# Patient Record
Sex: FEMALE | Race: WHITE | NOT HISPANIC OR LATINO | Employment: OTHER | URBAN - METROPOLITAN AREA
[De-identification: names, ages, dates, MRNs, and addresses within clinical notes are randomized per-mention and may not be internally consistent; named-entity substitution may affect disease eponyms.]

---

## 2024-07-12 ENCOUNTER — APPOINTMENT (OUTPATIENT)
Dept: CT IMAGING | Facility: CLINIC | Age: 39
End: 2024-07-12
Attending: EMERGENCY MEDICINE
Payer: COMMERCIAL

## 2024-07-12 ENCOUNTER — HOSPITAL ENCOUNTER (EMERGENCY)
Facility: CLINIC | Age: 39
Discharge: ANOTHER HOSPITAL | End: 2024-07-12
Attending: EMERGENCY MEDICINE | Admitting: EMERGENCY MEDICINE
Payer: COMMERCIAL

## 2024-07-12 ENCOUNTER — ANESTHESIA EVENT (OUTPATIENT)
Dept: SURGERY | Facility: CLINIC | Age: 39
End: 2024-07-12
Payer: COMMERCIAL

## 2024-07-12 ENCOUNTER — ANESTHESIA (OUTPATIENT)
Dept: SURGERY | Facility: CLINIC | Age: 39
End: 2024-07-12
Payer: COMMERCIAL

## 2024-07-12 VITALS
HEART RATE: 118 BPM | OXYGEN SATURATION: 96 % | BODY MASS INDEX: 31.66 KG/M2 | HEIGHT: 66 IN | TEMPERATURE: 99.2 F | WEIGHT: 197 LBS | SYSTOLIC BLOOD PRESSURE: 142 MMHG | DIASTOLIC BLOOD PRESSURE: 93 MMHG | RESPIRATION RATE: 18 BRPM

## 2024-07-12 DIAGNOSIS — N23 RENAL COLIC ON LEFT SIDE: ICD-10-CM

## 2024-07-12 DIAGNOSIS — N39.0 COMPLICATED UTI (URINARY TRACT INFECTION): ICD-10-CM

## 2024-07-12 LAB
ALBUMIN UR-MCNC: 100 MG/DL
ANION GAP SERPL CALCULATED.3IONS-SCNC: 16 MMOL/L (ref 7–15)
APPEARANCE UR: ABNORMAL
BACTERIA #/AREA URNS HPF: ABNORMAL /HPF
BASOPHILS # BLD AUTO: 0.1 10E3/UL (ref 0–0.2)
BASOPHILS NFR BLD AUTO: 0 %
BILIRUB UR QL STRIP: NEGATIVE
BUN SERPL-MCNC: 12.1 MG/DL (ref 6–20)
CALCIUM SERPL-MCNC: 9 MG/DL (ref 8.6–10)
CHLORIDE SERPL-SCNC: 101 MMOL/L (ref 98–107)
COLOR UR AUTO: YELLOW
CREAT SERPL-MCNC: 0.96 MG/DL (ref 0.51–0.95)
DEPRECATED HCO3 PLAS-SCNC: 21 MMOL/L (ref 22–29)
EGFRCR SERPLBLD CKD-EPI 2021: 77 ML/MIN/1.73M2
EOSINOPHIL # BLD AUTO: 0 10E3/UL (ref 0–0.7)
EOSINOPHIL NFR BLD AUTO: 0 %
ERYTHROCYTE [DISTWIDTH] IN BLOOD BY AUTOMATED COUNT: 14.5 % (ref 10–15)
GLUCOSE SERPL-MCNC: 136 MG/DL (ref 70–99)
GLUCOSE UR STRIP-MCNC: NEGATIVE MG/DL
HCG SERPL QL: NEGATIVE
HCT VFR BLD AUTO: 38.7 % (ref 35–47)
HGB BLD-MCNC: 13.4 G/DL (ref 11.7–15.7)
HGB UR QL STRIP: ABNORMAL
HOLD SPECIMEN: NORMAL
HYALINE CASTS: 1 /LPF
IMM GRANULOCYTES # BLD: 0.1 10E3/UL
IMM GRANULOCYTES NFR BLD: 1 %
KETONES UR STRIP-MCNC: NEGATIVE MG/DL
LACTATE SERPL-SCNC: 1.6 MMOL/L (ref 0.7–2)
LEUKOCYTE ESTERASE UR QL STRIP: ABNORMAL
LYMPHOCYTES # BLD AUTO: 0.6 10E3/UL (ref 0.8–5.3)
LYMPHOCYTES NFR BLD AUTO: 3 %
MCH RBC QN AUTO: 27 PG (ref 26.5–33)
MCHC RBC AUTO-ENTMCNC: 34.6 G/DL (ref 31.5–36.5)
MCV RBC AUTO: 78 FL (ref 78–100)
MONOCYTES # BLD AUTO: 1.1 10E3/UL (ref 0–1.3)
MONOCYTES NFR BLD AUTO: 4 %
MUCOUS THREADS #/AREA URNS LPF: PRESENT /LPF
NEUTROPHILS # BLD AUTO: 23.9 10E3/UL (ref 1.6–8.3)
NEUTROPHILS NFR BLD AUTO: 93 %
NITRATE UR QL: NEGATIVE
NRBC # BLD AUTO: 0 10E3/UL
NRBC BLD AUTO-RTO: 0 /100
PH UR STRIP: 7 [PH] (ref 5–7)
PLATELET # BLD AUTO: 262 10E3/UL (ref 150–450)
POTASSIUM SERPL-SCNC: 3.4 MMOL/L (ref 3.4–5.3)
RBC # BLD AUTO: 4.97 10E6/UL (ref 3.8–5.2)
RBC URINE: 60 /HPF
SODIUM SERPL-SCNC: 138 MMOL/L (ref 135–145)
SP GR UR STRIP: 1.02 (ref 1–1.03)
SQUAMOUS EPITHELIAL: 4 /HPF
UROBILINOGEN UR STRIP-MCNC: NORMAL MG/DL
WBC # BLD AUTO: 25.8 10E3/UL (ref 4–11)
WBC URINE: 99 /HPF

## 2024-07-12 PROCEDURE — 36415 COLL VENOUS BLD VENIPUNCTURE: CPT | Performed by: EMERGENCY MEDICINE

## 2024-07-12 PROCEDURE — 250N000013 HC RX MED GY IP 250 OP 250 PS 637: Performed by: EMERGENCY MEDICINE

## 2024-07-12 PROCEDURE — 81001 URINALYSIS AUTO W/SCOPE: CPT | Performed by: EMERGENCY MEDICINE

## 2024-07-12 PROCEDURE — 258N000003 HC RX IP 258 OP 636: Performed by: EMERGENCY MEDICINE

## 2024-07-12 PROCEDURE — 96375 TX/PRO/DX INJ NEW DRUG ADDON: CPT | Performed by: EMERGENCY MEDICINE

## 2024-07-12 PROCEDURE — 96374 THER/PROPH/DIAG INJ IV PUSH: CPT | Performed by: EMERGENCY MEDICINE

## 2024-07-12 PROCEDURE — 87186 SC STD MICRODIL/AGAR DIL: CPT | Performed by: EMERGENCY MEDICINE

## 2024-07-12 PROCEDURE — 250N000011 HC RX IP 250 OP 636: Performed by: EMERGENCY MEDICINE

## 2024-07-12 PROCEDURE — 87086 URINE CULTURE/COLONY COUNT: CPT | Performed by: EMERGENCY MEDICINE

## 2024-07-12 PROCEDURE — 99285 EMERGENCY DEPT VISIT HI MDM: CPT | Mod: 25 | Performed by: EMERGENCY MEDICINE

## 2024-07-12 PROCEDURE — 99291 CRITICAL CARE FIRST HOUR: CPT | Performed by: EMERGENCY MEDICINE

## 2024-07-12 PROCEDURE — 85025 COMPLETE CBC W/AUTO DIFF WBC: CPT | Performed by: EMERGENCY MEDICINE

## 2024-07-12 PROCEDURE — 74176 CT ABD & PELVIS W/O CONTRAST: CPT

## 2024-07-12 PROCEDURE — 83605 ASSAY OF LACTIC ACID: CPT | Performed by: EMERGENCY MEDICINE

## 2024-07-12 PROCEDURE — 84703 CHORIONIC GONADOTROPIN ASSAY: CPT | Performed by: EMERGENCY MEDICINE

## 2024-07-12 PROCEDURE — 87149 DNA/RNA DIRECT PROBE: CPT | Mod: 59 | Performed by: EMERGENCY MEDICINE

## 2024-07-12 PROCEDURE — 99285 EMERGENCY DEPT VISIT HI MDM: CPT | Performed by: EMERGENCY MEDICINE

## 2024-07-12 PROCEDURE — 80048 BASIC METABOLIC PNL TOTAL CA: CPT | Performed by: EMERGENCY MEDICINE

## 2024-07-12 RX ORDER — SODIUM CHLORIDE 9 MG/ML
1000 INJECTION, SOLUTION INTRAVENOUS CONTINUOUS
Status: DISCONTINUED | OUTPATIENT
Start: 2024-07-12 | End: 2024-07-12 | Stop reason: HOSPADM

## 2024-07-12 RX ORDER — HYDROMORPHONE HYDROCHLORIDE 1 MG/ML
0.5 INJECTION, SOLUTION INTRAMUSCULAR; INTRAVENOUS; SUBCUTANEOUS ONCE
Status: COMPLETED | OUTPATIENT
Start: 2024-07-12 | End: 2024-07-12

## 2024-07-12 RX ORDER — ACETAMINOPHEN 500 MG
1000 TABLET ORAL ONCE
Status: COMPLETED | OUTPATIENT
Start: 2024-07-12 | End: 2024-07-12

## 2024-07-12 RX ORDER — CEFTRIAXONE 2 G/1
2 INJECTION, POWDER, FOR SOLUTION INTRAMUSCULAR; INTRAVENOUS ONCE
Status: COMPLETED | OUTPATIENT
Start: 2024-07-12 | End: 2024-07-12

## 2024-07-12 RX ADMIN — SODIUM CHLORIDE 500 ML: 9 INJECTION, SOLUTION INTRAVENOUS at 21:04

## 2024-07-12 RX ADMIN — ACETAMINOPHEN 1000 MG: 500 TABLET, FILM COATED ORAL at 19:02

## 2024-07-12 RX ADMIN — CEFTRIAXONE SODIUM 2 G: 2 INJECTION, POWDER, FOR SOLUTION INTRAMUSCULAR; INTRAVENOUS at 21:03

## 2024-07-12 RX ADMIN — HYDROMORPHONE HYDROCHLORIDE 0.5 MG: 1 INJECTION, SOLUTION INTRAMUSCULAR; INTRAVENOUS; SUBCUTANEOUS at 19:09

## 2024-07-12 RX ADMIN — SODIUM CHLORIDE 1000 ML: 9 INJECTION, SOLUTION INTRAVENOUS at 19:02

## 2024-07-12 ASSESSMENT — ACTIVITIES OF DAILY LIVING (ADL)
ADLS_ACUITY_SCORE: 35

## 2024-07-12 ASSESSMENT — COLUMBIA-SUICIDE SEVERITY RATING SCALE - C-SSRS
2. HAVE YOU ACTUALLY HAD ANY THOUGHTS OF KILLING YOURSELF IN THE PAST MONTH?: NO
6. HAVE YOU EVER DONE ANYTHING, STARTED TO DO ANYTHING, OR PREPARED TO DO ANYTHING TO END YOUR LIFE?: NO
1. IN THE PAST MONTH, HAVE YOU WISHED YOU WERE DEAD OR WISHED YOU COULD GO TO SLEEP AND NOT WAKE UP?: NO

## 2024-07-12 NOTE — ED PROVIDER NOTES
"BP (!) 175/117   Pulse (!) 142   Temp (!) 102.2  F (39  C) (Tympanic)   Resp 18   Ht 1.676 m (5' 6\")   Wt 89.4 kg (197 lb)   SpO2 97%   BMI 31.80 kg/m      Dejah Brown is a 39-year-old female presenting with complaints of left flank pain.  Patient has history of kidney stones.  Febrile to 102  F on arrival and tachycardic.  Given patient's history, I recommended she be evaluated in the emergency department.  We discussed that she will likely require further testing and/or treatment beyond the capabilities of the current urgent care setting including labs and potential imaging.  Patient expresses understanding of this and agreement with the plan.       Gena Castaneda PA-C  07/12/24 [id number]    "

## 2024-07-12 NOTE — ED PROVIDER NOTES
"  History     Chief Complaint   Patient presents with    Flank Pain    Fever     HPI  Dejah Brown is a 39 year old female past medical history significant for kidney stones who presents the emergency department complaining of left flank pain and fever.  Patient states symptoms began about 3:00 this afternoon sharp pain in her left flank.  She shortly afterwards developed a temperature up to 102.2 Fahrenheit on arrival.  She denies any recent illness denies headache or visual changes has not any neck pain denies any chest pain or shortness of breath.  She has not had any focal numbness weakness any extremity.  She denies any bowel or bladder dysfunction.  She denies any pain with urination and has not noticed any hematuria.  She has not any leg swelling calf pain.    Allergies:  No Known Allergies    Problem List:    There are no problems to display for this patient.       Past Medical History:    No past medical history on file.    Past Surgical History:    No past surgical history on file.    Family History:    No family history on file.    Social History:  Marital Status:   [2]        Medications:    No current outpatient medications on file.        Review of Systems  As per HPI.  Physical Exam   BP: (!) 175/117  Pulse: (!) 142  Temp: (!) 102.2  F (39  C)  Resp: 18  Height: 167.6 cm (5' 6\")  Weight: 89.4 kg (197 lb)  SpO2: 97 %      Physical Exam  Constitutional:       Appearance: Normal appearance. She is not toxic-appearing.      Comments: Mild generalized distress.   HENT:      Nose: Nose normal.      Mouth/Throat:      Mouth: Mucous membranes are moist.      Pharynx: Oropharynx is clear.   Eyes:      Conjunctiva/sclera: Conjunctivae normal.   Cardiovascular:      Rate and Rhythm: Normal rate and regular rhythm.      Pulses: Normal pulses.      Heart sounds: Normal heart sounds. No murmur heard.  Pulmonary:      Effort: Pulmonary effort is normal.      Breath sounds: Normal breath sounds. No stridor. No " wheezing or rhonchi.   Abdominal:      Comments: Soft, nondistended, tender to palpation of the left flank.  No anterior abdominal tenderness is noted no guarding or rebound is present.  There is no hernia or masses noted.  Bowel sounds are positive.   Musculoskeletal:         General: Normal range of motion.      Cervical back: Normal range of motion and neck supple.      Right lower leg: No edema.      Left lower leg: No edema.   Skin:     General: Skin is warm and dry.      Findings: No rash.   Neurological:      General: No focal deficit present.      Mental Status: She is alert and oriented to person, place, and time.      Sensory: No sensory deficit.      Motor: No weakness.      Coordination: Coordination normal.   Psychiatric:         Mood and Affect: Mood normal.     ED Course        Procedures              Critical Care time:  none               Results for orders placed or performed during the hospital encounter of 07/12/24 (from the past 24 hour(s))   CBC with platelets, differential    Narrative    The following orders were created for panel order CBC with platelets, differential.  Procedure                               Abnormality         Status                     ---------                               -----------         ------                     CBC with platelets and d...[[id number]]  Abnormal            Final result                 Please view results for these tests on the individual orders.   Basic metabolic panel   Result Value Ref Range    Sodium 138 135 - 145 mmol/L    Potassium 3.4 3.4 - 5.3 mmol/L    Chloride 101 98 - 107 mmol/L    Carbon Dioxide (CO2) 21 (L) 22 - 29 mmol/L    Anion Gap 16 (H) 7 - 15 mmol/L    Urea Nitrogen 12.1 6.0 - 20.0 mg/dL    Creatinine 0.96 (H) 0.51 - 0.95 mg/dL    GFR Estimate 77 >60 mL/min/1.73m2    Calcium 9.0 8.6 - 10.0 mg/dL    Glucose 136 (H) 70 - 99 mg/dL   Lactic acid whole blood   Result Value Ref Range    Lactic Acid 1.6 0.7 - 2.0 mmol/L   Blood Culture  Peripheral Blood    Specimen: Peripheral Blood   Result Value Ref Range    Culture Positive on the 1st day of incubation (A)     Culture Gram negative bacilli (AA)    HCG qualitative   Result Value Ref Range    hCG Serum Qualitative Negative Negative   [facility] Draw    Narrative    The following orders were created for panel order [facility].  Procedure                               Abnormality         Status                     ---------                               -----------         ------                     Extra Blue Top Tube[[id number]]                              Final result                 Please view results for these tests on the individual orders.   CBC with platelets and differential   Result Value Ref Range    WBC Count 25.8 (H) 4.0 - 11.0 10e3/uL    RBC Count 4.97 3.80 - 5.20 10e6/uL    Hemoglobin 13.4 11.7 - 15.7 g/dL    Hematocrit 38.7 35.0 - 47.0 %    MCV 78 78 - 100 fL    MCH 27.0 26.5 - 33.0 pg    MCHC 34.6 31.5 - 36.5 g/dL    RDW 14.5 10.0 - 15.0 %    Platelet Count 262 150 - 450 10e3/uL    % Neutrophils 93 %    % Lymphocytes 3 %    % Monocytes 4 %    % Eosinophils 0 %    % Basophils 0 %    % Immature Granulocytes 1 %    NRBCs per 100 WBC 0 <1 /100    Absolute Neutrophils 23.9 (H) 1.6 - 8.3 10e3/uL    Absolute Lymphocytes 0.6 (L) 0.8 - 5.3 10e3/uL    Absolute Monocytes 1.1 0.0 - 1.3 10e3/uL    Absolute Eosinophils 0.0 0.0 - 0.7 10e3/uL    Absolute Basophils 0.1 0.0 - 0.2 10e3/uL    Absolute Immature Granulocytes 0.1 <=0.4 10e3/uL    Absolute NRBCs 0.0 10e3/uL   Extra Blue Top Tube   Result Value Ref Range    Hold Specimen JIC    UA with Microscopic reflex to Culture    Specimen: Urine, Clean Catch   Result Value Ref Range    Color Urine Yellow Colorless, Straw, Light Yellow, Yellow    Appearance Urine Slightly Cloudy (A) Clear    Glucose Urine Negative Negative mg/dL    Bilirubin Urine Negative Negative    Ketones Urine Negative Negative mg/dL    Specific Gravity Urine 1.016 1.003 - 1.035     Blood Urine Moderate (A) Negative    pH Urine 7.0 5.0 - 7.0    Protein Albumin Urine 100 (A) Negative mg/dL    Urobilinogen Urine Normal Normal, 2.0 mg/dL    Nitrite Urine Negative Negative    Leukocyte Esterase Urine Large (A) Negative    Bacteria Urine Few (A) None Seen /HPF    Mucus Urine Present (A) None Seen /LPF    RBC Urine 60 (H) <=2 /HPF    WBC Urine 99 (H) <=5 /HPF    Squamous Epithelials Urine 4 (H) <=1 /HPF    Hyaline Casts Urine 1 <=2 /LPF    Narrative    Urine Culture ordered based on laboratory criteria   Blood Culture Peripheral Blood    Specimen: Peripheral Blood   Result Value Ref Range    Culture Positive on the 1st day of incubation (A)     Culture Gram negative bacilli (AA)    Verigene GN Panel    Specimen: Peripheral Blood   Result Value Ref Range    Acinetobacter species Not Detected Not Detected    Citrobacter species Not Detected Not Detected    Enterobacter species Not Detected Not Detected    Proteus species Not Detected Not Detected    Escherichia coli Detected (A) Not Detected    Klebsiella pneumoniae Not Detected Not Detected    Klebsiella oxytoca Not Detected Not Detected    Pseudomonas aeruginosa Not Detected Not Detected    CTX-M Not Detected Not Detected, NA    KPC Not Detected Not Detected, NA    NDM Not Detected Not Detected, NA    VIM Not Detected Not Detected, NA    IMP Not Detected Not Detected, NA    OXA Not Detected Not Detected, NA    Narrative    Specimen tested with ElectroJetigene multiplex, gram-negative blood culture nucleic acid test for the following targets: Acinetobacter species, Citrobacter species, Enterobacter species, Proteus species, Escherichia coli, Klebsiella pneumoniae, Klebsiella oxytoca, Pseudomonas aeruginosa, and the following resistance markers: CTX-M, KPC, NDM, VIM, IMP and OXA.   Abd/pelvis CT - no contrast - Stone Protocol    Addendum: 7/12/2024    CLERICAL ADDENDUM:    Typo in the body of the report. In the KIDNEYS AND BLADDER section, it should read:      Obstructing 4 x 5 mm proximal left ureteral stone, near the ureteropelvic junction.     There is an additional nonobstructing left renal lower pole 4 x 5 mm stone.     END ADDENDUM      Narrative    EXAM: CT ABDOMEN PELVIS W/O CONTRAST  LOCATION: [facility]  DATE: 7/12/2024    INDICATION: L flank pain ; fever , h o kidney stones  COMPARISON: None.  TECHNIQUE: CT scan of the abdomen and pelvis was performed without IV contrast. Multiplanar reformats were obtained. Dose reduction techniques were used.  CONTRAST: None.    FINDINGS:   LOWER CHEST: Minimal basilar atelectasis    HEPATOBILIARY: Cholecystectomy. Possible mild hepatic steatosis.    PANCREAS: Normal.    SPLEEN: Normal.    ADRENAL GLANDS: Normal.    KIDNEYS/BLADDER: Nonobstructing 4 x 5 mm proximal left ureteral stone, near the ureteropelvic junction. Nonobstructing left renal lower pole 4 x 5 mm stone. Mild to moderate left hydronephrosis and perinephric stranding.     BOWEL: Unremarkable bowel and appendix.    LYMPH NODES: No adenopathy.    VASCULATURE: Nonaneurysmal aorta.    PELVIC ORGANS: Normal.    MUSCULOSKELETAL: Normal.      Impression    IMPRESSION:     1.  Obstructing 4 x 5 mm stone near the left ureteropelvic junction with mild to moderate hydronephrosis.    2.  Additional nonobstructing 4 x 5 mm inferior left renal pelvis stone.    3.  No other stones.         Medications   acetaminophen (TYLENOL) tablet 1,000 mg (has no administration in time range)   sodium chloride 0.9% BOLUS 1,000 mL (has no administration in time range)       Assessments & Plan (with Medical Decision Making) records were reviewed including past medical history medications and allergies.  Patient has had no recent visits to her family doctor or urgent care or emergency medicine doctor.  Patient was given a liter of fluids and Dilaudid for pain.  Labs were obtained.  Patient was given Tylenol for fever.  Blood cultures were obtained.   Independently reviewed and interpreted lab studies.  His white count was 25.8 hemoglobin 13.4 platelet count 262 there was a left shift.  Lactic acid was 1.6.  Basic metabolic panel significant for carbon accent 21 anion gap 16 creatinine 0.96 with a glucose of 136.  Urinalysis with moderate blood large leukocyte Estrace few bacteria 60 RBCs 99 WBCs with 4 squamous cells.  Pregnancy test was negative.  CT renal stone protocol was ordered at this time.  I independently reviewed this and agree with radiologist findings of directing 4 x 5 mm stone near the left ureteropelvic junction with mild to moderate hydronephrosis and additional nonobstructing 4 x 5 mm inferior left renal pelvis stone is present.  Bowel and appendix are unremarkable.  Due to this finding I discussed the case with Dr. Conklin with [facility] urology department.  Due to the presentation labs and imaging study he recommended that the patient be transferred to the [facility] for stent placement.  Patient was covered with 2 g of ceftriaxone blood cultures had been drawn previously.  And  request to drive down to the [facility] on I discussed the risks and benefits of this and they are comfortable going down by car at this time.  Patient will initially go to the [facility] emergency department and I did discuss the case with Dr. Joe at the [facility] ED and he accepted the patient in transfer.     I have reviewed the nursing notes.    I have reviewed the findings, diagnosis, plan and need for follow up with the patient.             New Prescriptions    No medications on file       Final diagnoses:   Renal colic on left side   Complicated UTI (urinary tract infection)       7/12/2024   [facility] EMERGENCY DEPT       Isaac Craft MD  07/13/24 0943

## 2024-07-12 NOTE — ED TRIAGE NOTES
L flank pain with fever.  H/o infected kidney stone     Triage Assessment (Adult)       Row Name 07/12/24 8391          Triage Assessment    Airway WDL WDL        Cognitive/Neuro/Behavioral WDL    Cognitive/Neuro/Behavioral WDL WDL

## 2024-07-13 ENCOUNTER — HOSPITAL ENCOUNTER (INPATIENT)
Facility: CLINIC | Age: 39
LOS: 2 days | Discharge: HOME | End: 2024-07-15
Attending: EMERGENCY MEDICINE | Admitting: UROLOGY
Payer: COMMERCIAL

## 2024-07-13 ENCOUNTER — APPOINTMENT (OUTPATIENT)
Dept: GENERAL RADIOLOGY | Facility: CLINIC | Age: 39
End: 2024-07-13
Attending: UROLOGY
Payer: COMMERCIAL

## 2024-07-13 DIAGNOSIS — N12 PYELONEPHRITIS: ICD-10-CM

## 2024-07-13 DIAGNOSIS — N20.1 URETEROLITHIASIS: ICD-10-CM

## 2024-07-13 PROBLEM — N20.0 NEPHROLITHIASIS: Status: ACTIVE | Noted: 2024-07-13

## 2024-07-13 PROBLEM — N17.9 ACUTE KIDNEY FAILURE, UNSPECIFIED (H): Status: ACTIVE | Noted: 2024-07-13

## 2024-07-13 LAB
ABO/RH(D): NORMAL
ACINETOBACTER SPECIES: NOT DETECTED
ANION GAP SERPL CALCULATED.3IONS-SCNC: 13 MMOL/L (ref 7–15)
ANTIBODY SCREEN: NEGATIVE
APTT PPP: 24 SECONDS (ref 22–38)
BACTERIA UR CULT: NORMAL
BASE EXCESS BLDV CALC-SCNC: -7.3 MMOL/L (ref -3–3)
BUN SERPL-MCNC: 16.7 MG/DL (ref 6–20)
CALCIUM SERPL-MCNC: 7.7 MG/DL (ref 8.6–10)
CHLORIDE SERPL-SCNC: 107 MMOL/L (ref 98–107)
CITROBACTER SPECIES: NOT DETECTED
CREAT SERPL-MCNC: 1.5 MG/DL (ref 0.51–0.95)
CTX-M: NOT DETECTED
DEPRECATED HCO3 PLAS-SCNC: 18 MMOL/L (ref 22–29)
EGFRCR SERPLBLD CKD-EPI 2021: 45 ML/MIN/1.73M2
ENTEROBACTER SPECIES: NOT DETECTED
ERYTHROCYTE [DISTWIDTH] IN BLOOD BY AUTOMATED COUNT: 15.3 % (ref 10–15)
ESCHERICHIA COLI: DETECTED
GLUCOSE SERPL-MCNC: 109 MG/DL (ref 70–99)
HCO3 BLDV-SCNC: 17 MMOL/L (ref 21–28)
HCT VFR BLD AUTO: 33.2 % (ref 35–47)
HGB BLD-MCNC: 10.8 G/DL (ref 11.7–15.7)
HOLD SPECIMEN: NORMAL
IMP: NOT DETECTED
INR PPP: 1.09 (ref 0.85–1.15)
KLEBSIELLA OXYTOCA: NOT DETECTED
KLEBSIELLA PNEUMONIAE: NOT DETECTED
KPC: NOT DETECTED
LACTATE SERPL-SCNC: 4.6 MMOL/L (ref 0.7–2)
LACTATE SERPL-SCNC: 7.2 MMOL/L (ref 0.7–2)
MCH RBC QN AUTO: 26.9 PG (ref 26.5–33)
MCHC RBC AUTO-ENTMCNC: 32.5 G/DL (ref 31.5–36.5)
MCV RBC AUTO: 83 FL (ref 78–100)
NDM: NOT DETECTED
O2/TOTAL GAS SETTING VFR VENT: 21 %
OXA (DETECTED/NOT DETECTED): NOT DETECTED
OXYHGB MFR BLDV: 46 % (ref 70–75)
PCO2 BLDV: 33 MM HG (ref 40–50)
PH BLDV: 7.34 [PH] (ref 7.32–7.43)
PLATELET # BLD AUTO: 154 10E3/UL (ref 150–450)
PO2 BLDV: 26 MM HG (ref 25–47)
POTASSIUM SERPL-SCNC: 2.8 MMOL/L (ref 3.4–5.3)
POTASSIUM SERPL-SCNC: 4.5 MMOL/L (ref 3.4–5.3)
PROTEUS SPECIES: NOT DETECTED
PSEUDOMONAS AERUGINOSA: NOT DETECTED
RBC # BLD AUTO: 4.01 10E6/UL (ref 3.8–5.2)
SAO2 % BLDV: 47.2 % (ref 70–75)
SODIUM SERPL-SCNC: 138 MMOL/L (ref 135–145)
SPECIMEN EXPIRATION DATE: NORMAL
VIM: NOT DETECTED
WBC # BLD AUTO: 21 10E3/UL (ref 4–11)

## 2024-07-13 PROCEDURE — 83605 ASSAY OF LACTIC ACID: CPT | Performed by: EMERGENCY MEDICINE

## 2024-07-13 PROCEDURE — C1769 GUIDE WIRE: HCPCS | Performed by: UROLOGY

## 2024-07-13 PROCEDURE — 370N000017 HC ANESTHESIA TECHNICAL FEE, PER MIN: Performed by: UROLOGY

## 2024-07-13 PROCEDURE — 250N000009 HC RX 250: Performed by: NURSE ANESTHETIST, CERTIFIED REGISTERED

## 2024-07-13 PROCEDURE — 36415 COLL VENOUS BLD VENIPUNCTURE: CPT | Performed by: UROLOGY

## 2024-07-13 PROCEDURE — 82805 BLOOD GASES W/O2 SATURATION: CPT | Performed by: EMERGENCY MEDICINE

## 2024-07-13 PROCEDURE — 85730 THROMBOPLASTIN TIME PARTIAL: CPT | Performed by: EMERGENCY MEDICINE

## 2024-07-13 PROCEDURE — 87186 SC STD MICRODIL/AGAR DIL: CPT | Performed by: UROLOGY

## 2024-07-13 PROCEDURE — 36415 COLL VENOUS BLD VENIPUNCTURE: CPT | Performed by: EMERGENCY MEDICINE

## 2024-07-13 PROCEDURE — 250N000013 HC RX MED GY IP 250 OP 250 PS 637: Performed by: STUDENT IN AN ORGANIZED HEALTH CARE EDUCATION/TRAINING PROGRAM

## 2024-07-13 PROCEDURE — 258N000003 HC RX IP 258 OP 636: Performed by: STUDENT IN AN ORGANIZED HEALTH CARE EDUCATION/TRAINING PROGRAM

## 2024-07-13 PROCEDURE — 0T778DZ DILATION OF LEFT URETER WITH INTRALUMINAL DEVICE, VIA NATURAL OR ARTIFICIAL OPENING ENDOSCOPIC: ICD-10-PCS | Performed by: UROLOGY

## 2024-07-13 PROCEDURE — 250N000011 HC RX IP 250 OP 636: Performed by: EMERGENCY MEDICINE

## 2024-07-13 PROCEDURE — 999N000141 HC STATISTIC PRE-PROCEDURE NURSING ASSESSMENT: Performed by: UROLOGY

## 2024-07-13 PROCEDURE — 86900 BLOOD TYPING SEROLOGIC ABO: CPT | Performed by: EMERGENCY MEDICINE

## 2024-07-13 PROCEDURE — 99223 1ST HOSP IP/OBS HIGH 75: CPT | Mod: 25 | Performed by: UROLOGY

## 2024-07-13 PROCEDURE — C2617 STENT, NON-COR, TEM W/O DEL: HCPCS | Performed by: UROLOGY

## 2024-07-13 PROCEDURE — 710N000010 HC RECOVERY PHASE 1, LEVEL 2, PER MIN: Performed by: UROLOGY

## 2024-07-13 PROCEDURE — 250N000011 HC RX IP 250 OP 636: Performed by: UROLOGY

## 2024-07-13 PROCEDURE — 52332 CYSTOSCOPY AND TREATMENT: CPT | Performed by: NURSE ANESTHETIST, CERTIFIED REGISTERED

## 2024-07-13 PROCEDURE — 250N000011 HC RX IP 250 OP 636: Performed by: NURSE ANESTHETIST, CERTIFIED REGISTERED

## 2024-07-13 PROCEDURE — 250N000025 HC SEVOFLURANE, PER MIN: Performed by: UROLOGY

## 2024-07-13 PROCEDURE — 99140 ANES COMP EMERGENCY COND: CPT | Performed by: STUDENT IN AN ORGANIZED HEALTH CARE EDUCATION/TRAINING PROGRAM

## 2024-07-13 PROCEDURE — 250N000013 HC RX MED GY IP 250 OP 250 PS 637: Performed by: EMERGENCY MEDICINE

## 2024-07-13 PROCEDURE — 85610 PROTHROMBIN TIME: CPT | Performed by: EMERGENCY MEDICINE

## 2024-07-13 PROCEDURE — 255N000002 HC RX 255 OP 636: Performed by: UROLOGY

## 2024-07-13 PROCEDURE — 84132 ASSAY OF SERUM POTASSIUM: CPT | Performed by: UROLOGY

## 2024-07-13 PROCEDURE — 85027 COMPLETE CBC AUTOMATED: CPT | Performed by: STUDENT IN AN ORGANIZED HEALTH CARE EDUCATION/TRAINING PROGRAM

## 2024-07-13 PROCEDURE — 52332 CYSTOSCOPY AND TREATMENT: CPT | Mod: LT | Performed by: UROLOGY

## 2024-07-13 PROCEDURE — 272N000001 HC OR GENERAL SUPPLY STERILE: Performed by: UROLOGY

## 2024-07-13 PROCEDURE — 87086 URINE CULTURE/COLONY COUNT: CPT | Performed by: UROLOGY

## 2024-07-13 PROCEDURE — 999N000180 XR SURGERY CARM FLUORO LESS THAN 5 MIN: Mod: TC

## 2024-07-13 PROCEDURE — 360N000082 HC SURGERY LEVEL 2 W/ FLUORO, PER MIN: Performed by: UROLOGY

## 2024-07-13 PROCEDURE — 80048 BASIC METABOLIC PNL TOTAL CA: CPT | Performed by: STUDENT IN AN ORGANIZED HEALTH CARE EDUCATION/TRAINING PROGRAM

## 2024-07-13 PROCEDURE — 52332 CYSTOSCOPY AND TREATMENT: CPT | Performed by: STUDENT IN AN ORGANIZED HEALTH CARE EDUCATION/TRAINING PROGRAM

## 2024-07-13 PROCEDURE — 250N000011 HC RX IP 250 OP 636: Performed by: STUDENT IN AN ORGANIZED HEALTH CARE EDUCATION/TRAINING PROGRAM

## 2024-07-13 PROCEDURE — 258N000003 HC RX IP 258 OP 636: Performed by: EMERGENCY MEDICINE

## 2024-07-13 PROCEDURE — 36415 COLL VENOUS BLD VENIPUNCTURE: CPT | Performed by: STUDENT IN AN ORGANIZED HEALTH CARE EDUCATION/TRAINING PROGRAM

## 2024-07-13 PROCEDURE — 99140 ANES COMP EMERGENCY COND: CPT | Performed by: NURSE ANESTHETIST, CERTIFIED REGISTERED

## 2024-07-13 PROCEDURE — 120N000002 HC R&B MED SURG/OB UMMC

## 2024-07-13 DEVICE — STENT URETERAL PERCUFLEX PLUS 7FRX28CM M0061752740: Type: IMPLANTABLE DEVICE | Site: URETER | Status: FUNCTIONAL

## 2024-07-13 RX ORDER — LABETALOL HYDROCHLORIDE 5 MG/ML
10 INJECTION, SOLUTION INTRAVENOUS
Status: DISCONTINUED | OUTPATIENT
Start: 2024-07-13 | End: 2024-07-13 | Stop reason: HOSPADM

## 2024-07-13 RX ORDER — HYDROMORPHONE HYDROCHLORIDE 1 MG/ML
0.2 INJECTION, SOLUTION INTRAMUSCULAR; INTRAVENOUS; SUBCUTANEOUS EVERY 5 MIN PRN
Status: DISCONTINUED | OUTPATIENT
Start: 2024-07-13 | End: 2024-07-13 | Stop reason: HOSPADM

## 2024-07-13 RX ORDER — ACETAMINOPHEN 325 MG/1
975 TABLET ORAL
Status: DISCONTINUED | OUTPATIENT
Start: 2024-07-13 | End: 2024-07-13 | Stop reason: HOSPADM

## 2024-07-13 RX ORDER — IOPAMIDOL 510 MG/ML
INJECTION, SOLUTION INTRAVASCULAR PRN
Status: DISCONTINUED | OUTPATIENT
Start: 2024-07-13 | End: 2024-07-13 | Stop reason: HOSPADM

## 2024-07-13 RX ORDER — AMOXICILLIN 250 MG
1 CAPSULE ORAL 2 TIMES DAILY
Status: DISCONTINUED | OUTPATIENT
Start: 2024-07-13 | End: 2024-07-15 | Stop reason: HOSPADM

## 2024-07-13 RX ORDER — METOPROLOL SUCCINATE 25 MG/1
25 TABLET, EXTENDED RELEASE ORAL DAILY
COMMUNITY

## 2024-07-13 RX ORDER — ACETAMINOPHEN 500 MG
1000 TABLET ORAL ONCE
Status: COMPLETED | OUTPATIENT
Start: 2024-07-13 | End: 2024-07-13

## 2024-07-13 RX ORDER — HYDROMORPHONE HCL IN WATER/PF 6 MG/30 ML
0.4 PATIENT CONTROLLED ANALGESIA SYRINGE INTRAVENOUS
Status: DISCONTINUED | OUTPATIENT
Start: 2024-07-13 | End: 2024-07-15 | Stop reason: HOSPADM

## 2024-07-13 RX ORDER — HYDROMORPHONE HYDROCHLORIDE 1 MG/ML
0.4 INJECTION, SOLUTION INTRAMUSCULAR; INTRAVENOUS; SUBCUTANEOUS EVERY 5 MIN PRN
Status: DISCONTINUED | OUTPATIENT
Start: 2024-07-13 | End: 2024-07-13 | Stop reason: HOSPADM

## 2024-07-13 RX ORDER — SODIUM CHLORIDE, SODIUM LACTATE, POTASSIUM CHLORIDE, CALCIUM CHLORIDE 600; 310; 30; 20 MG/100ML; MG/100ML; MG/100ML; MG/100ML
INJECTION, SOLUTION INTRAVENOUS CONTINUOUS
Status: DISCONTINUED | OUTPATIENT
Start: 2024-07-13 | End: 2024-07-13 | Stop reason: HOSPADM

## 2024-07-13 RX ORDER — KETOROLAC TROMETHAMINE 15 MG/ML
15 INJECTION, SOLUTION INTRAMUSCULAR; INTRAVENOUS ONCE
Status: COMPLETED | OUTPATIENT
Start: 2024-07-13 | End: 2024-07-13

## 2024-07-13 RX ORDER — ONDANSETRON 4 MG/1
4 TABLET, ORALLY DISINTEGRATING ORAL EVERY 30 MIN PRN
Status: DISCONTINUED | OUTPATIENT
Start: 2024-07-13 | End: 2024-07-13 | Stop reason: HOSPADM

## 2024-07-13 RX ORDER — FENTANYL CITRATE 50 UG/ML
50 INJECTION, SOLUTION INTRAMUSCULAR; INTRAVENOUS EVERY 5 MIN PRN
Status: DISCONTINUED | OUTPATIENT
Start: 2024-07-13 | End: 2024-07-13 | Stop reason: HOSPADM

## 2024-07-13 RX ORDER — ONDANSETRON 2 MG/ML
8 INJECTION INTRAMUSCULAR; INTRAVENOUS ONCE
Status: COMPLETED | OUTPATIENT
Start: 2024-07-13 | End: 2024-07-13

## 2024-07-13 RX ORDER — PROCHLORPERAZINE 25 MG
25 SUPPOSITORY, RECTAL RECTAL EVERY 12 HOURS PRN
Status: DISCONTINUED | OUTPATIENT
Start: 2024-07-13 | End: 2024-07-15 | Stop reason: HOSPADM

## 2024-07-13 RX ORDER — NALOXONE HYDROCHLORIDE 0.4 MG/ML
0.2 INJECTION, SOLUTION INTRAMUSCULAR; INTRAVENOUS; SUBCUTANEOUS
Status: DISCONTINUED | OUTPATIENT
Start: 2024-07-13 | End: 2024-07-15 | Stop reason: HOSPADM

## 2024-07-13 RX ORDER — OXYCODONE HYDROCHLORIDE 5 MG/1
5 TABLET ORAL EVERY 4 HOURS PRN
Status: DISCONTINUED | OUTPATIENT
Start: 2024-07-13 | End: 2024-07-15 | Stop reason: HOSPADM

## 2024-07-13 RX ORDER — CEFTRIAXONE 1 G/1
1 INJECTION, POWDER, FOR SOLUTION INTRAMUSCULAR; INTRAVENOUS EVERY 24 HOURS
Status: DISCONTINUED | OUTPATIENT
Start: 2024-07-13 | End: 2024-07-14

## 2024-07-13 RX ORDER — SODIUM CHLORIDE 9 MG/ML
INJECTION, SOLUTION INTRAVENOUS CONTINUOUS
Status: DISCONTINUED | OUTPATIENT
Start: 2024-07-13 | End: 2024-07-13

## 2024-07-13 RX ORDER — ONDANSETRON 4 MG/1
4 TABLET, ORALLY DISINTEGRATING ORAL EVERY 6 HOURS PRN
Status: DISCONTINUED | OUTPATIENT
Start: 2024-07-13 | End: 2024-07-15 | Stop reason: HOSPADM

## 2024-07-13 RX ORDER — AMOXICILLIN 250 MG
1 CAPSULE ORAL 2 TIMES DAILY PRN
Status: DISCONTINUED | OUTPATIENT
Start: 2024-07-13 | End: 2024-07-15 | Stop reason: HOSPADM

## 2024-07-13 RX ORDER — ACETAMINOPHEN 650 MG/1
650 SUPPOSITORY RECTAL EVERY 4 HOURS PRN
Status: DISCONTINUED | OUTPATIENT
Start: 2024-07-13 | End: 2024-07-15 | Stop reason: HOSPADM

## 2024-07-13 RX ORDER — NALOXONE HYDROCHLORIDE 0.4 MG/ML
0.4 INJECTION, SOLUTION INTRAMUSCULAR; INTRAVENOUS; SUBCUTANEOUS
Status: DISCONTINUED | OUTPATIENT
Start: 2024-07-13 | End: 2024-07-15 | Stop reason: HOSPADM

## 2024-07-13 RX ORDER — DEXAMETHASONE SODIUM PHOSPHATE 4 MG/ML
4 INJECTION, SOLUTION INTRA-ARTICULAR; INTRALESIONAL; INTRAMUSCULAR; INTRAVENOUS; SOFT TISSUE
Status: DISCONTINUED | OUTPATIENT
Start: 2024-07-13 | End: 2024-07-13 | Stop reason: HOSPADM

## 2024-07-13 RX ORDER — LIDOCAINE 40 MG/G
CREAM TOPICAL
Status: DISCONTINUED | OUTPATIENT
Start: 2024-07-13 | End: 2024-07-15 | Stop reason: HOSPADM

## 2024-07-13 RX ORDER — AMOXICILLIN 250 MG
2 CAPSULE ORAL 2 TIMES DAILY
Status: DISCONTINUED | OUTPATIENT
Start: 2024-07-13 | End: 2024-07-15 | Stop reason: HOSPADM

## 2024-07-13 RX ORDER — LIDOCAINE HYDROCHLORIDE 20 MG/ML
INJECTION, SOLUTION INFILTRATION; PERINEURAL PRN
Status: DISCONTINUED | OUTPATIENT
Start: 2024-07-13 | End: 2024-07-13

## 2024-07-13 RX ORDER — ONDANSETRON 2 MG/ML
4 INJECTION INTRAMUSCULAR; INTRAVENOUS EVERY 6 HOURS PRN
Status: DISCONTINUED | OUTPATIENT
Start: 2024-07-13 | End: 2024-07-15 | Stop reason: HOSPADM

## 2024-07-13 RX ORDER — SODIUM CHLORIDE 9 MG/ML
INJECTION, SOLUTION INTRAVENOUS CONTINUOUS
Status: DISCONTINUED | OUTPATIENT
Start: 2024-07-13 | End: 2024-07-15 | Stop reason: HOSPADM

## 2024-07-13 RX ORDER — ACETAMINOPHEN 325 MG/1
650 TABLET ORAL EVERY 4 HOURS PRN
Status: DISCONTINUED | OUTPATIENT
Start: 2024-07-13 | End: 2024-07-15 | Stop reason: HOSPADM

## 2024-07-13 RX ORDER — CALCIUM CARBONATE 500 MG/1
1000 TABLET, CHEWABLE ORAL 4 TIMES DAILY PRN
Status: DISCONTINUED | OUTPATIENT
Start: 2024-07-13 | End: 2024-07-15 | Stop reason: HOSPADM

## 2024-07-13 RX ORDER — SIMETHICONE 80 MG
80 TABLET,CHEWABLE ORAL EVERY 6 HOURS PRN
Status: DISCONTINUED | OUTPATIENT
Start: 2024-07-13 | End: 2024-07-15 | Stop reason: HOSPADM

## 2024-07-13 RX ORDER — ONDANSETRON 2 MG/ML
4 INJECTION INTRAMUSCULAR; INTRAVENOUS EVERY 30 MIN PRN
Status: DISCONTINUED | OUTPATIENT
Start: 2024-07-13 | End: 2024-07-13 | Stop reason: HOSPADM

## 2024-07-13 RX ORDER — SODIUM CHLORIDE, SODIUM LACTATE, POTASSIUM CHLORIDE, CALCIUM CHLORIDE 600; 310; 30; 20 MG/100ML; MG/100ML; MG/100ML; MG/100ML
INJECTION, SOLUTION INTRAVENOUS CONTINUOUS
Status: DISCONTINUED | OUTPATIENT
Start: 2024-07-13 | End: 2024-07-13

## 2024-07-13 RX ORDER — POTASSIUM CHLORIDE 7.45 MG/ML
10 INJECTION INTRAVENOUS
Status: COMPLETED | OUTPATIENT
Start: 2024-07-13 | End: 2024-07-13

## 2024-07-13 RX ORDER — FENTANYL CITRATE 50 UG/ML
25 INJECTION, SOLUTION INTRAMUSCULAR; INTRAVENOUS EVERY 5 MIN PRN
Status: DISCONTINUED | OUTPATIENT
Start: 2024-07-13 | End: 2024-07-13 | Stop reason: HOSPADM

## 2024-07-13 RX ORDER — AMOXICILLIN 250 MG
2 CAPSULE ORAL 2 TIMES DAILY PRN
Status: DISCONTINUED | OUTPATIENT
Start: 2024-07-13 | End: 2024-07-15 | Stop reason: HOSPADM

## 2024-07-13 RX ORDER — PROPOFOL 10 MG/ML
INJECTION, EMULSION INTRAVENOUS PRN
Status: DISCONTINUED | OUTPATIENT
Start: 2024-07-13 | End: 2024-07-13

## 2024-07-13 RX ORDER — DEXAMETHASONE SODIUM PHOSPHATE 4 MG/ML
INJECTION, SOLUTION INTRA-ARTICULAR; INTRALESIONAL; INTRAMUSCULAR; INTRAVENOUS; SOFT TISSUE PRN
Status: DISCONTINUED | OUTPATIENT
Start: 2024-07-13 | End: 2024-07-13

## 2024-07-13 RX ORDER — HYDROMORPHONE HYDROCHLORIDE 1 MG/ML
0.5 INJECTION, SOLUTION INTRAMUSCULAR; INTRAVENOUS; SUBCUTANEOUS
Status: DISCONTINUED | OUTPATIENT
Start: 2024-07-13 | End: 2024-07-15 | Stop reason: HOSPADM

## 2024-07-13 RX ORDER — AMLODIPINE BESYLATE 10 MG/1
10 TABLET ORAL DAILY
COMMUNITY

## 2024-07-13 RX ORDER — PROCHLORPERAZINE MALEATE 5 MG
10 TABLET ORAL EVERY 6 HOURS PRN
Status: DISCONTINUED | OUTPATIENT
Start: 2024-07-13 | End: 2024-07-15 | Stop reason: HOSPADM

## 2024-07-13 RX ORDER — FENTANYL CITRATE 50 UG/ML
INJECTION, SOLUTION INTRAMUSCULAR; INTRAVENOUS PRN
Status: DISCONTINUED | OUTPATIENT
Start: 2024-07-13 | End: 2024-07-13

## 2024-07-13 RX ORDER — HYDROMORPHONE HCL IN WATER/PF 6 MG/30 ML
0.2 PATIENT CONTROLLED ANALGESIA SYRINGE INTRAVENOUS
Status: DISCONTINUED | OUTPATIENT
Start: 2024-07-13 | End: 2024-07-15 | Stop reason: HOSPADM

## 2024-07-13 RX ORDER — FERROUS SULFATE 325(65) MG
325 TABLET ORAL
COMMUNITY

## 2024-07-13 RX ORDER — NALOXONE HYDROCHLORIDE 0.4 MG/ML
0.1 INJECTION, SOLUTION INTRAMUSCULAR; INTRAVENOUS; SUBCUTANEOUS
Status: DISCONTINUED | OUTPATIENT
Start: 2024-07-13 | End: 2024-07-13 | Stop reason: HOSPADM

## 2024-07-13 RX ORDER — TAMSULOSIN HYDROCHLORIDE 0.4 MG/1
0.4 CAPSULE ORAL DAILY
Status: DISCONTINUED | OUTPATIENT
Start: 2024-07-13 | End: 2024-07-15 | Stop reason: HOSPADM

## 2024-07-13 RX ORDER — OXYBUTYNIN CHLORIDE 5 MG/1
5 TABLET ORAL 3 TIMES DAILY PRN
Status: DISCONTINUED | OUTPATIENT
Start: 2024-07-13 | End: 2024-07-15 | Stop reason: HOSPADM

## 2024-07-13 RX ADMIN — LIDOCAINE HYDROCHLORIDE 80 MG: 20 INJECTION, SOLUTION INFILTRATION; PERINEURAL at 02:58

## 2024-07-13 RX ADMIN — ACETAMINOPHEN 650 MG: 325 TABLET, FILM COATED ORAL at 20:24

## 2024-07-13 RX ADMIN — POTASSIUM CHLORIDE 10 MEQ: 7.46 INJECTION, SOLUTION INTRAVENOUS at 10:40

## 2024-07-13 RX ADMIN — POTASSIUM CHLORIDE 10 MEQ: 7.46 INJECTION, SOLUTION INTRAVENOUS at 09:27

## 2024-07-13 RX ADMIN — DEXAMETHASONE SODIUM PHOSPHATE 4 MG: 4 INJECTION, SOLUTION INTRA-ARTICULAR; INTRALESIONAL; INTRAMUSCULAR; INTRAVENOUS; SOFT TISSUE at 03:15

## 2024-07-13 RX ADMIN — SUCCINYLCHOLINE CHLORIDE 100 MG: 20 INJECTION, SOLUTION INTRAMUSCULAR; INTRAVENOUS; PARENTERAL at 03:00

## 2024-07-13 RX ADMIN — SODIUM CHLORIDE 1000 ML: 9 INJECTION, SOLUTION INTRAVENOUS at 00:48

## 2024-07-13 RX ADMIN — TAMSULOSIN HYDROCHLORIDE 0.4 MG: 0.4 CAPSULE ORAL at 08:55

## 2024-07-13 RX ADMIN — DEXAMETHASONE SODIUM PHOSPHATE 4 MG: 4 INJECTION, SOLUTION INTRA-ARTICULAR; INTRALESIONAL; INTRAMUSCULAR; INTRAVENOUS; SOFT TISSUE at 02:59

## 2024-07-13 RX ADMIN — SODIUM CHLORIDE, POTASSIUM CHLORIDE, SODIUM LACTATE AND CALCIUM CHLORIDE: 600; 310; 30; 20 INJECTION, SOLUTION INTRAVENOUS at 02:47

## 2024-07-13 RX ADMIN — OXYCODONE HYDROCHLORIDE 5 MG: 5 TABLET ORAL at 21:31

## 2024-07-13 RX ADMIN — POTASSIUM CHLORIDE 10 MEQ: 7.46 INJECTION, SOLUTION INTRAVENOUS at 15:40

## 2024-07-13 RX ADMIN — SODIUM CHLORIDE: 9 INJECTION, SOLUTION INTRAVENOUS at 20:25

## 2024-07-13 RX ADMIN — ONDANSETRON 8 MG: 2 INJECTION INTRAMUSCULAR; INTRAVENOUS at 00:48

## 2024-07-13 RX ADMIN — KETOROLAC TROMETHAMINE 15 MG: 15 INJECTION, SOLUTION INTRAMUSCULAR; INTRAVENOUS at 00:31

## 2024-07-13 RX ADMIN — ACETAMINOPHEN 1000 MG: 500 TABLET ORAL at 00:53

## 2024-07-13 RX ADMIN — ACETAMINOPHEN 650 MG: 325 TABLET, FILM COATED ORAL at 12:50

## 2024-07-13 RX ADMIN — POTASSIUM CHLORIDE 10 MEQ: 7.46 INJECTION, SOLUTION INTRAVENOUS at 14:33

## 2024-07-13 RX ADMIN — POTASSIUM CHLORIDE 10 MEQ: 7.46 INJECTION, SOLUTION INTRAVENOUS at 11:39

## 2024-07-13 RX ADMIN — FENTANYL CITRATE 50 MCG: 50 INJECTION INTRAMUSCULAR; INTRAVENOUS at 02:58

## 2024-07-13 RX ADMIN — CEFTRIAXONE SODIUM 1 G: 1 INJECTION, POWDER, FOR SOLUTION INTRAMUSCULAR; INTRAVENOUS at 20:25

## 2024-07-13 RX ADMIN — MIDAZOLAM 1 MG: 1 INJECTION INTRAMUSCULAR; INTRAVENOUS at 02:47

## 2024-07-13 RX ADMIN — SUGAMMADEX 100 MG: 100 INJECTION, SOLUTION INTRAVENOUS at 03:21

## 2024-07-13 RX ADMIN — Medication 8 MG: at 02:58

## 2024-07-13 RX ADMIN — PROPOFOL 130 MG: 10 INJECTION, EMULSION INTRAVENOUS at 02:59

## 2024-07-13 RX ADMIN — SODIUM CHLORIDE, POTASSIUM CHLORIDE, SODIUM LACTATE AND CALCIUM CHLORIDE: 600; 310; 30; 20 INJECTION, SOLUTION INTRAVENOUS at 02:20

## 2024-07-13 RX ADMIN — SENNOSIDES AND DOCUSATE SODIUM 1 TABLET: 50; 8.6 TABLET ORAL at 08:55

## 2024-07-13 RX ADMIN — SODIUM CHLORIDE: 9 INJECTION, SOLUTION INTRAVENOUS at 06:05

## 2024-07-13 RX ADMIN — POTASSIUM CHLORIDE 10 MEQ: 7.46 INJECTION, SOLUTION INTRAVENOUS at 13:24

## 2024-07-13 ASSESSMENT — ACTIVITIES OF DAILY LIVING (ADL)
ADLS_ACUITY_SCORE: 21
ADLS_ACUITY_SCORE: 25
ADLS_ACUITY_SCORE: 42
ADLS_ACUITY_SCORE: 35
ADLS_ACUITY_SCORE: 25
ADLS_ACUITY_SCORE: 21
ADLS_ACUITY_SCORE: 25
ADLS_ACUITY_SCORE: 35
ADLS_ACUITY_SCORE: 25
ADLS_ACUITY_SCORE: 35
ADLS_ACUITY_SCORE: 25
ADLS_ACUITY_SCORE: 42
ADLS_ACUITY_SCORE: 25
ADLS_ACUITY_SCORE: 42
ADLS_ACUITY_SCORE: 25

## 2024-07-13 ASSESSMENT — COLUMBIA-SUICIDE SEVERITY RATING SCALE - C-SSRS
6. HAVE YOU EVER DONE ANYTHING, STARTED TO DO ANYTHING, OR PREPARED TO DO ANYTHING TO END YOUR LIFE?: NO
2. HAVE YOU ACTUALLY HAD ANY THOUGHTS OF KILLING YOURSELF IN THE PAST MONTH?: NO
1. IN THE PAST MONTH, HAVE YOU WISHED YOU WERE DEAD OR WISHED YOU COULD GO TO SLEEP AND NOT WAKE UP?: NO

## 2024-07-13 NOTE — OP NOTE
OPERATIVE REPORT    PREOPERATIVE DIAGNOSIS: Left ureteral stone    POSTOPERATIVE DIAGNOSIS:  Same    PROCEDURES PERFORMED:   1. Cystourethroscopy with left retrograde pyelography  2. Placement of left ureteral stent.  3. Intraoperative interpretation of fluoroscopic imaging.     STAFF SURGEON: Bunny Conklin MD    RESIDENT: Edgar Artis MD    ANESTHESIA: Choice    ESTIMATED BLOOD LOSS: 0 mL.     DRAINS:  7Fr x 28cm double J ureteral stent on the L, 16Fr escalona catheter       OPERATIVE INDICATIONS:   Dejah Brown is a 39 year old female who presented with a left obstructing ureteral stone in the setting of UTI.  The patient was counseled on the alternatives, risks, and benefits and elected to proceed with the above stated procedure.    DESCRIPTION OF PROCEDURE:    After informed consent was obtained, the patient was taken to the operating room, and moved to the operating table.  After adequate anesthesia was induced, the patient was repositioned in dorsal lithotomy position and prepped and draped in the usual sterile fashion. A timeout was taken to confirm correct patient, procedure and laterality.     A 22-Welsh cystoscope was inserted into a well lubricated urethra. The urethra was unremarkable.  The bladder was free of tumors, stones or diverticuli.  The media was hazy and the bladder irrigated.  Bilateral ureteral orifices were orthotopic.  A Sensor guidewire was advanced into the left renal pelvis with the aid of a 5-Welsh open ended ureteral catheter.  A retrograde pyelogram demonstrated moderate    hydronephrosis .  The wire was replaced and a 7Fr x 28cm double J ureteral stent was advanced over the guidewire under fluoroscopic guidance with a good curl in the upper pole and bladder.  The stent passed up easily with no appreciable resistance.  The bladder was then drained.    The patient tolerated the procedure well.  There were no complications.         PLAN:   - admit   - continue broad spectrum abx   -  escalona until 24 hours afebrile     As attending surgeon, I, Bunny Conklin MD, was scrubbed and present for the entire procedure.

## 2024-07-13 NOTE — PLAN OF CARE
Goal Outcome Evaluation     ICU End of Shift Summary:     Received patient from PACU at 0510 am post ureteral stent placement.    Neuro: Alert and oriented x4. Denies any pain. Afebrile at 97.1F.   Pulm/Resp: On 2 lpm oxygen via nasal cannula. Clear lung sounds. No shortness of breath.  CV: sinus tachycardia 100-110's. Normotensive  GI: regular diet. Denies nausea/vomiting. No bowel movement during the shift  : Escalona catheter,light pinkish to red in color.  Access: peripheral IV on right AC  Skin: no skin issues. Intact skin  Gtts: Received on LR at 100 ml/hr. Then changed to normal saline at 150 ml/hr as ordered.      -AM labs collected.  -Keep escalona until 24 hours afebrile- as ordered.  -Continue POC

## 2024-07-13 NOTE — PROGRESS NOTES
"Urology  Progress Note    NAEO  Pain well controlled  Now afebrile with Tmax 103 prior to stenting last night   Kan in place     Exam  BP (!) 89/68   Pulse 109   Temp 97.1  F (36.2  C) (Oral)   Resp 23   Ht 1.676 m (5' 6\")   Wt 93.8 kg (206 lb 12.7 oz)   SpO2 100%   BMI 33.38 kg/m    No acute distress  Unlabored breathing  Abdomen soft,  appropriately tender, nondistended.   Kan with dark yellow tinged urine in tubing      UOP --/125    Labs  Recent Labs   Lab Test 07/13/24  0717 07/12/24  1854   WBC 21.0* 25.8*   HGB 10.8* 13.4   CR 1.50* 0.96*        Assessment/Plan  39 year old female POD#1 s/p L ureteral stent placement for obstructing stone in the setting of urosepsis.   - Pain control as needed  - Continue CTX while culture pending, will treat with 10-14 day course for complicated UTI  - Kan to remain until 24 hours afebrile, consider removal this afternoon if continues to feel well  - Transfer to the floor, anticipate discharge tomorrow am pending culture results    Seen and examined with the chief resident. Will discuss with Dr. Conklin.    King Mcnamara MD   Urology Resident PGY-4     Contacting the Urology Team     Please use the following job codes to reach the Urology Team. Note that you must use an in house phone and that job codes cannot receive text pages.   On weekdays, dial [phone] (or star-star-star [phone] on the new [employer] telephones) then [phone] to reach the Adult Urology resident or PA on call  On weekdays, dial [phone] (or star-star-star [phone] on the new [employer] telephones) then [phone] to reach the Pediatric Urology resident  On weeknights and weekends, dial [phone] (or star-star-star 777 on the new [employer] telephones) then [phone] to reach the Urology resident on call (for both Adult and Pediatrics)            "

## 2024-07-13 NOTE — ANESTHESIA PROCEDURE NOTES
Airway       Patient location during procedure: OR       Procedure Start/Stop Times: 7/13/2024 3:01 AM  Staff -        CRNA: Paris Pantoja APRN CRNA       Performed By: CRNA  Consent for Airway        Urgency: elective  Indications and Patient Condition       Indications for airway management: jayjay-procedural       Induction type:RSI       Mask difficulty assessment: 0 - not attempted    Final Airway Details       Final airway type: endotracheal airway       Successful airway: ETT - single and Oral  Endotracheal Airway Details        ETT size (mm): 6.5       Cuffed: yes       Successful intubation technique: video laryngoscopy       VL Blade Size: Glidescope 3       Grade View of Cords: 1       Adjucts: stylet       Position: Right       Measured from: gums/teeth       Secured at (cm): 21       Bite block used: None    Post intubation assessment        Number of attempts at approach: 1       Number of other approaches attempted: 0       Secured with: silk tape       Ease of procedure: easy       Dentition: Intact and Unchanged    Medication(s) Administered   Medication Administration Time: 7/13/2024 3:01 AM

## 2024-07-13 NOTE — ANESTHESIA POSTPROCEDURE EVALUATION
Patient: Dejah Brown    Procedure: Procedure(s):  Cystoscopy, retrogrades, insert stent ureter(s), combined       Anesthesia Type:  General    Note:  Disposition: Inpatient   Postop Pain Control: Uneventful            Sign Out: Well controlled pain   PONV: No   Neuro/Psych: Uneventful            Sign Out: Acceptable/Baseline neuro status   Airway/Respiratory: Uneventful            Sign Out: Acceptable/Baseline resp. status   CV/Hemodynamics: Uneventful            Sign Out: Acceptable CV status; No obvious hypovolemia; No obvious fluid overload   Other NRE: NONE   DID A NON-ROUTINE EVENT OCCUR? No    Event details/Postop Comments:  Patient awakes to voice. Denies pain, nausea.     With ongoing tachycardia (130s-->120), elevated lactate on presentation (7), IMC appropriate for patient monitoring.           Last vitals:  Vitals Value Taken Time   BP     Temp     Pulse 122 07/13/24 0341   Resp 15 07/13/24 0341   SpO2 100 % 07/13/24 0341   Vitals shown include unfiled device data.    Electronically Signed By: Nita Walter MD  July 13, 2024  3:42 AM

## 2024-07-13 NOTE — PROGRESS NOTES
Admitted to 8MS at 1845. A/O x 4. Denies pain or discomfort. Ambulatory in room. Skin shows no breakdown. Oriented to call light and phone.

## 2024-07-13 NOTE — ANESTHESIA PREPROCEDURE EVALUATION
Anesthesia Pre-Procedure Evaluation    Patient: Dejah Brown   MRN: [id number] : 1985        Procedure : Procedure(s):  Combined Cystoscopy, Insert Stent Ureter(s)          No past medical history on file.   No past surgical history on file.   No Known Allergies   Social History     Tobacco Use    Smoking status: Not on file    Smokeless tobacco: Not on file   Substance Use Topics    Alcohol use: Not on file      Wt Readings from Last 1 Encounters:   24 89.4 kg (197 lb)        Anesthesia Evaluation   Pt has had prior anesthetic. Type: General.        ROS/MED HX  ENT/Pulmonary:  - neg pulmonary ROS     Neurologic:  - neg neurologic ROS     Cardiovascular:    (-) murmur   METS/Exercise Tolerance:     Hematologic:  - neg hematologic  ROS     Musculoskeletal:       GI/Hepatic: Comment: N/v      Renal/Genitourinary:     (+)       Nephrolithiasis ,       Endo:       Psychiatric/Substance Use:  - neg psychiatric ROS     Infectious Disease:     (+) Recent Fever,           Malignancy:       Other:            Physical Exam    Airway        Mallampati: III   TM distance: > 3 FB   Neck ROM: full   Mouth opening: > 3 cm    Respiratory Devices and Support         Dental       (+) Minor Abnormalities - some fillings, tiny chips      Cardiovascular          Rhythm and rate: regular and tachycardia (-) no murmur    Pulmonary   pulmonary exam normal        breath sounds clear to auscultation           OUTSIDE LABS:  CBC:   Lab Results   Component Value Date    WBC 25.8 (H) 2024    HGB 13.4 2024    HCT 38.7 2024     2024     BMP:   Lab Results   Component Value Date     2024    POTASSIUM 3.4 2024    CHLORIDE 101 2024    CO2 21 (L) 2024    BUN 12.1 2024    CR 0.96 (H) 2024     (H) 2024     COAGS:   Lab Results   Component Value Date    PTT 24 2024    INR 1.09 2024     POC:   Lab Results   Component Value Date    HCGS Negative  "07/12/2024     HEPATIC: No results found for: \"ALBUMIN\", \"PROTTOTAL\", \"ALT\", \"AST\", \"GGT\", \"ALKPHOS\", \"BILITOTAL\", \"BILIDIRECT\", \"JASON\"  OTHER:   Lab Results   Component Value Date    LACT 7.2 (HH) 07/13/2024    YAHIR 9.0 07/12/2024       Anesthesia Plan    ASA Status:  2, emergent    NPO Status:  ELEVATED Aspiration Risk/Unknown    Anesthesia Type: General.     - Airway: ETT   Induction: RSI.   Maintenance: Balanced.   Techniques and Equipment:     - Airway: Video-Laryngoscope       Consents    Anesthesia Plan(s) and associated risks, benefits, and realistic alternatives discussed. Questions answered and patient/representative(s) expressed understanding.     - Discussed:     - Discussed with:  Patient      - Extended Intubation/Ventilatory Support Discussed: No.      - Patient is DNR/DNI Status: No     Use of blood products discussed: No .     Postoperative Care    Pain management: Oral pain medications, Multi-modal analgesia.   PONV prophylaxis: Ondansetron (or other 5HT-3), Dexamethasone or Solumedrol     Comments:    Other Comments: Discussed risks of anesthesia including nausea, vomiting, sore throat, dental damage, cardiopulmonary complications, agitation, neurologic complications, and serious complications.               Nita Walter MD    I have reviewed the pertinent notes and labs in the chart from the past 30 days and (re)examined the patient.  Any updates or changes from those notes are reflected in this note.              # Obesity: Estimated body mass index is 31.8 kg/m  as calculated from the following:    Height as of 7/12/24: 1.676 m (5' 6\").    Weight as of 7/12/24: 89.4 kg (197 lb).      "

## 2024-07-13 NOTE — PROGRESS NOTES
Goal Outcome Evaluation     ICU End of Shift Summary:     Irma No tele  Critical K @2.8, replaced per protocol  Critical blood cultures from 7/12 draws , MD notified     Neuro:Afebrile, A&O x4. Denies any pain. Afebrile at 97.1F.   Resp: LS CTAB, sats >95 on RA  CV: ST, -110's. Normotensive  GI: Regular diet. BMx2  : Escalona catheter in place, adqquate aark faith urine.  Access: PIV x1  Skin: Grossly intact  Gtts: 0.9%NS 100ml/hr   Plan   -Keep escalona until 24 hours afebrile- as ordered.  -Continue POC

## 2024-07-13 NOTE — ED TRIAGE NOTES
Referred to ED from [facility] for obstructing kidney stone; plan to wait in ED til OR is ready. Pt is tremulous and vomiting during triage. T103.5, /139, P139. Rates L flank pain 10/10.      Triage Assessment (Adult)       Row Name 07/13/24 0012          Triage Assessment    Airway WDL WDL        Respiratory WDL    Respiratory WDL X;rhythm/pattern     Rhythm/Pattern, Respiratory tachypneic        Skin Circulation/Temperature WDL    Skin Circulation/Temperature WDL WDL        Cardiac WDL    Cardiac WDL X   /139     Cardiac Rhythm ST        Peripheral/Neurovascular WDL    Peripheral Neurovascular WDL WDL        Cognitive/Neuro/Behavioral WDL    Cognitive/Neuro/Behavioral WDL WDL

## 2024-07-13 NOTE — ED PROVIDER NOTES
"  History     Chief Complaint   Patient presents with    Kidney Stone Related     Referred to ED from [facility] for obstructing kidney stone; plan to wait in ED til OR is ready.     HPI  Dejah Brown is a 39 year old female with PMH notable for nephrolithiasis  who presents to the ED with infected kidney stone. Patient sent as transfer from [facility] for urologic intervention.  Patient was evaluated there and found to have leukocytosis, evidence of UTI on urinalysis, and 5 mm diameter renal stone at the UPJ with associated hydronephrosis.  Patient was given acetaminophen, NS bolus, hydromorphone, and ceftriaxone at the other hospital.  Patient's symptoms started at about 3 PM this past afternoon.  Patient noted left flank pain.    Physical Exam   BP: (!) 206/139  Pulse: (!) 145  Temp: (S) (!) 103.4  F (39.7  C) (rec'd tyl 2 hours ago)  Resp: 28  Height: 167.6 cm (5' 6\")  Weight: 93.8 kg (206 lb 12.7 oz)  SpO2: 98 %    Physical Exam  General: uncomfortable appearing. Appears stated age.   HENT: MMM, no oropharyngeal lesions  Eyes: PERRL, normal sclerae   Cardio: Tachycardic rate. Regular rhythm. Extremities well perfused  Resp: Normal work of breathing, Normal respiratory rate.   Abdomen: left CVA and left side abdominal tenderness, non-distended, no rebound, no guarding  Neuro: alert without signs of confusion. CN II-XII grossly intact. Grossly normal strength and sensation in all extremities.   Psych: normal affect, normal behavior      ED Course      Procedures         The patient has signs of Severe Sepsis     If one the following conditions is present, a 30 mL/kg bolus is recommended as part of the 6 hour bundle (IBW can be used for BMI >30, or document refusal/contraindication):      1.   Initial hypotension  defined as 2 bps < 90 or map < 65 in the 6hrs before or 3hrs after time zero.     2.  Lactate >4.      The patient has signs of Severe Sepsis as evidenced by:    1. 2 SIRS criteria, AND  2. Suspected " infection, AND   3. Organ dysfunction: Lactic Acidosis with value >2.0    Time severe sepsis diagnosis confirmed: at outside ED prior to transfer to [facility]    3 Hour Severe Sepsis Bundle Completion:    1. Initial Lactic Acid Result:   Recent Labs   Lab Test 07/13/24  0249 07/13/24  0034 07/12/24  1854   LACT 4.6* 7.2* 1.6     2. Blood Cultures before Antibiotics: Yes (at outside ED)  3. Broad Spectrum Antibiotics Administered:  yes       Anti-infectives (From admission through now)      Start     Dose/Rate Route Frequency Ordered Stop    07/13/24 2100  cefTRIAXone (ROCEPHIN) 1 g vial to attach to  mL bag for ADULTS or NS 50 mL bag for PEDS         1 g  over 15-30 Minutes Intravenous EVERY 24 HOURS 07/13/24 0029              4. Is initial hypotension present?     No (IV fluid bolus NOT required). IV Fluid volume administered: 1 L in addition to IV fluids given at outside ED                  Severe Sepsis reassessment:  1. Repeat Lactic Acid Level within 6 hours of time zero: 4.6  2. MAP>65 after initial IVF bolus, will continue to monitor fluid status and vital signs    I attest to having performed a repeat sepsis exam and assessment of perfusion at 0045 and the results demonstrate no change.       Labs Ordered and Resulted from Time of ED Arrival to Time of ED Departure   LACTIC ACID WHOLE BLOOD WITH 1X REPEAT IN 2 HR WHEN >2 - Abnormal       Result Value    Lactic Acid, Initial 7.2 (*)    BLOOD GAS VENOUS - Abnormal    pH Venous 7.34      pCO2 Venous 33 (*)     pO2 Venous 26      Bicarbonate Venous 17 (*)     Base Excess/Deficit Venous -7.3 (*)     FIO2 21      Oxyhemoglobin Venous 46 (*)     O2 Sat, Venous 47.2 (*)    TYPE AND SCREEN, ADULT    ABO/RH(D) A POS      Antibody Screen Negative      SPECIMEN EXPIRATION DATE [id number]       XR Surgery ALPESH L/T 5 Min Fluoro   Final Result           Critical Care Addendum  My initial assessment, based on my focused history and physical exam, established a high  suspicion that Dejah Brown has sepsis with indication for early goal-directed therapy, which requires immediate intervention, and therefore she is critically ill.     After the initial assessment, the care team initiated multiple lab tests, initiated IV fluid administration, initiated medication therapy with IV ketorolac and ondansetron, and consulted with urology  to provide stabilization care. Due to the critical nature of this patient, I reassessed nursing observations, vital signs, and physical exam multiple times prior to her disposition.     Time also spent performing documentation, reviewing test results, discussion with consultants, and coordination of care.     Critical care time (excluding teaching time and procedures): 38 minutes.           Assessments & Plan   Patient presenting with infected stone in the left ureter. Vitals in the ED notable for tachycardia, HTN, and fever. Nursing notes reviewed.     Lactate quite elevated at 7.2, and tachycardic with known infected kidney stone. Cultures drawn and Abx initiated at the outside hospital prior to transfer. IV fluids continued. In the ED, the patient's symptoms were managed with ketorolac, acetaminophen, and ondansetron, with improvement in symptoms upon reassessment.     Urology accepted the patient for admission to their service, are planning procedural intervention.       Final diagnoses:   Pyelonephritis   Ureterolithiasis     There are no discharge medications for this patient.    --  Cayetano Richard MD   Emergency Medicine   [facility] EMERGENCY DEPARTMENT  7/12/2024       Cayetano Richard MD  07/13/24 08

## 2024-07-13 NOTE — ANESTHESIA CARE TRANSFER NOTE
Patient: Dejah Brown    Procedure: Procedure(s):  Cystoscopy, retrogrades, insert stent ureter(s), combined       Diagnosis: Kidney stone on left side [N20.0]  Diagnosis Additional Information: No value filed.    Anesthesia Type:   General     Note:    Oropharynx: oropharynx clear of all foreign objects and spontaneously breathing  Level of Consciousness: drowsy  Oxygen Supplementation: face mask  Level of Supplemental Oxygen (L/min / FiO2): 8  Independent Airway: airway patency satisfactory and stable  Dentition: dentition unchanged  Vital Signs Stable: post-procedure vital signs reviewed and stable  Report to RN Given: handoff report given  Patient transferred to: PACU    Handoff Report: Identifed the Patient, Identified the Reponsible Provider, Reviewed the pertinent medical history, Discussed the surgical course, Reviewed Intra-OP anesthesia mangement and issues during anesthesia, Set expectations for post-procedure period and Allowed opportunity for questions and acknowledgement of understanding      Vitals:  Vitals Value Taken Time   BP 87/65    Temp 37.2C    Pulse 123    Resp 25    SpO2 100%        Electronically Signed By: JERE Pace CRNA  July 13, 2024  3:37 AM

## 2024-07-13 NOTE — PLAN OF CARE
Admitted/transferred from: [facility] PACU on 7/13/24 at 0510 am  Reason for admission/transfer: post ureteral stent placement  2 RN skin assessment: completed by ERVIN Bro and ERVIN Read  Result of skin assessment and interventions/actions: skin intact. No skin issues  Height, weight, drug calc weight: Done  Patient belongings (see Flowsheet)  MDRO education added to care planN/A  ?

## 2024-07-13 NOTE — PHARMACY-ADMISSION MEDICATION HISTORY
Pharmacist Admission Medication History    Admission medication history is complete. The information provided in this note is only as accurate as the sources available at the time of the update.    Information Source(s): Patient and CareEverywhere/SureScripts via in-person    Changes made to PTA medication list:  Added: amlodipine, iron, metoprolol succinate   Deleted: None  Changed: None    Allergies reviewed with patient and updates made in EHR: yes - confirmed NKDA    Medication History Completed By: JOAN MAURO [facility] 7/13/2024 2:12 PM    PTA Med List   Medication Sig Last Dose    amLODIPine (NORVASC) 10 MG tablet Take 10 mg by mouth daily 7/12/2024 at 0900    ferrous sulfate (FEROSUL) 325 (65 Fe) MG tablet Take 325 mg by mouth daily (with breakfast) 7/12/2024 at 0900    metoprolol succinate ER (TOPROL XL) 25 MG 24 hr tablet Take 25 mg by mouth daily 7/12/2024 at 0900

## 2024-07-13 NOTE — H&P
Urology H&P    Name: Dejah Brown    MRN: [id number]   YOB: 1985               Chief Complaint:   Left nephrolithiasis in the setting urosepsis     History is obtained from the patient and chart review          History of Present Illness:   Dejah Brown is a 39 year old female with history of renal stones who presented to the ED with left flank pain and fever. Her symptoms began at 3pm today with left flank pain. She presented ot the ED and was found to have a T max of 102.2 F. She was otherwise tachycardic to 142 as well. OVSS. Labs demonstrated a WBC of 26, a creatinine of 0.96 and a UA demonstrating LE, bacteria, 99 WBCs and 60 RBCs. A CT was done which demonstrates a 5mm obstructing L UPJ stone. Given she presented at [facility] where no urologic care was available she was transferred to [facility] for L ureteral stent placement.           Past Medical History:   No past medical history on file.         Past Surgical History:   No past surgical history on file.         Social History:     Social History     Tobacco Use    Smoking status: Not on file    Smokeless tobacco: Not on file   Substance Use Topics    Alcohol use: Not on file            Family History:   No family history on file.         Allergies:   No Known Allergies         Medications:     Current Facility-Administered Medications   Medication Dose Route Frequency Provider Last Rate Last Admin    acetaminophen (TYLENOL) tablet 650 mg  650 mg Oral Q4H PRN Edgar Artis MD        Or    acetaminophen (TYLENOL) Suppository 650 mg  650 mg Rectal Q4H PRN Edgar Artis MD        acetaminophen (TYLENOL) tablet 1,000 mg  1,000 mg Oral Once Cayetano Richard MD        benzocaine-menthol (CHLORASEPTIC) 6-10 MG lozenge 1 lozenge  1 lozenge Buccal Q1H PRN Edgar Artis MD        benzocaine-menthol (CHLORASEPTIC) 6-10 MG lozenge 1 lozenge  1 lozenge Buccal Q1H PRN Edgar Artis MD        calcium carbonate (TUMS) chewable tablet 1,000 mg   1,000 mg Oral 4x Daily PRN Edgar Artis MD        cefTRIAXone (ROCEPHIN) 1 g vial to attach to  mL bag for ADULTS or NS 50 mL bag for PEDS  1 g Intravenous Q24H Edgar Artis MD        gentamicin (GARAMYCIN) 300 mg in sodium chloride 0.9 % 100 mL intermittent infusion  300 mg Intravenous Once Bunny Conklin MD        HYDROmorphone (DILAUDID) injection 0.2 mg  0.2 mg Intravenous Q2H PRN Edgar Artis MD        HYDROmorphone (DILAUDID) injection 0.4 mg  0.4 mg Intravenous Q2H PRN Edgar Artis MD        HYDROmorphone (PF) (DILAUDID) injection 0.5 mg  0.5 mg Intravenous Q1H PRN Cayetano Richard MD        ketorolac (TORADOL) injection 15 mg  15 mg Intravenous Once Cayetano Richard MD        lidocaine (LMX4) cream   Topical Q1H PRN Edgar Artis MD        lidocaine 1 % 0.1-1 mL  0.1-1 mL Other Q1H PRN Edgar Artis MD        melatonin tablet 1 mg  1 mg Oral At Bedtime PRN Edgar Artis MD        melatonin tablet 5 mg  5 mg Oral At Bedtime PRN Edgar Artis MD        ondansetron (ZOFRAN ODT) ODT tab 4 mg  4 mg Oral Q6H PREdgar Rosenbaum MD        Or    ondansetron (ZOFRAN) injection 4 mg  4 mg Intravenous Q6H PRN Edgar Artis MD        ondansetron (ZOFRAN) injection 8 mg  8 mg Intravenous Once Cayetano Richard MD        oxyCODONE (ROXICODONE) tablet 5 mg  5 mg Oral Q4H PRN Edgar Artis MD        oxyCODONE IR (ROXICODONE) half-tab 2.5 mg  2.5 mg Oral Q4H PREdgar Rosenbaum MD        prochlorperazine (COMPAZINE) injection 10 mg  10 mg Intravenous Q6H PREdgar Rosenbaum MD        Or    prochlorperazine (COMPAZINE) tablet 10 mg  10 mg Oral Q6H PRN Edgar Artis MD        Or    prochlorperazine (COMPAZINE) suppository 25 mg  25 mg Rectal Q12H PRN Edgar Artis MD        senna-docusate (SENOKOT-S/PERICOLACE) 8.6-50 MG per tablet 1 tablet  1 tablet Oral BID Edgar Pritchett MD        Or    senna-docusate (SENOKOT-S/PERICOLACE) 8.6-50 MG per tablet 2 tablet  2 tablet Oral BID Edgar Pritchett MD         senna-docusate (SENOKOT-S/PERICOLACE) 8.6-50 MG per tablet 1 tablet  1 tablet Oral BID Edgar Artis MD        Or    senna-docusate (SENOKOT-S/PERICOLACE) 8.6-50 MG per tablet 2 tablet  2 tablet Oral BID Edgar Artis MD        simethicone (MYLICON) chewable tablet 80 mg  80 mg Oral Q6H PRN Edgar Artis MD        sodium chloride (PF) 0.9% PF flush 3 mL  3 mL Intracatheter Q8H Edgar Artis MD        sodium chloride (PF) 0.9% PF flush 3 mL  3 mL Intracatheter q1 min prn Edgar Artis MD        sodium chloride 0.9 % infusion   Intravenous Continuous Edgar Artis MD        sodium chloride 0.9% BOLUS 1,000 mL  1,000 mL Intravenous Once Cayetano Richard MD         No current outpatient medications on file.             Review of Systems:    ROS: 10 point ROS neg other than the symptoms noted above in the HPI           Physical Exam:   VS:  T: Data Unavailable    HR: Data Unavailable    BP: Data Unavailable    RR: Data Unavailable   GEN:  AOx3.  But appears ill   CV:  Tachycardic   LUNGS: Non-labored breathing.   BACK:   + CVA tenderness.   ABD:  Soft.  NT.  ND.  No rebound or guarding.  No masses.  EXT:  Warm, well perfused.    SKIN:  Warm.  Dry.  No rashes.  NEURO:  CN grossly intact.            Data:   All laboratory data reviewed:    Recent Labs   Lab 07/12/24  1854   WBC 25.8*   HGB 13.4          Recent Labs   Lab 07/12/24  1854      POTASSIUM 3.4   CHLORIDE 101   CO2 21*   BUN 12.1   CR 0.96*   *   YAHIR 9.0       Recent Labs   Lab 07/12/24  1856   COLOR Yellow   APPEARANCE Slightly Cloudy*   URINEGLC Negative   URINEBILI Negative   URINEKETONE Negative   SG 1.016   URINEPH 7.0   PROTEIN 100*   NITRITE Negative   LEUKEST Large*   RBCU 60*   WBCU 99*       All pertinent imaging reviewed:    CT scan of the abdomen:   5mm stone in the left UPJ     Renal ultrasound:               Impression and Plan:   Impression:   Deajh Brown is a 39 year old female with PMH of nephrolithiasis who is  transferred from [facility] for an obstructing 5mm stone in the setting of urosepsis. She is febrile, tachycardic and has a WBC of 26 in the setting of a UA which is concerning for infection. Given this plan for emergent cysto, L RPG, L stent placement.       Plan:     - emergent cysto, L RPG, L stent placement  - admit to urology afterwards for monitoring     - continue CTX (received a dose at [facility]) and will tailor based on cultures   - will give one dose of periprocedure gentamicin    - continue escalona catheter after OR until 24 hours afebrile              This patient's exam findings, labs, and imaging discussed with urology staff surgeon Dr. Conklin, who developed the treatment plan.    Edgar Artis MD PGY4  Urology Resident        Physician Attestation   I saw this patient with the resident and agree with the resident/fellow's findings and plan of care as documented in the note.      Key findings:   Febrile, Tachycardia.  Obstructive left ureter stone    I recommend urgent stent placement today.  Continue antibiotics  Hydration.  Followup cultures.    Bunny Conklin MD  Date of Service (when I saw the patient): 07/13/24

## 2024-07-13 NOTE — OR NURSING
PACU to Inpatient Nursing Handoff    Patient Dejah Brown is a 39 year old female who speaks English.   Procedure Procedure(s):  Cystoscopy, retrogrades, insert stent ureter(s), combined   Surgeon(s) Primary: Bunny Conklin MD  Resident - Assisting: Edgar Artis MD     No Known Allergies    Isolation  [unfilled]     Past Medical History   has no past medical history on file.    Anesthesia Choice   Dermatome Level     Preop Meds acetaminophen (Tylenol) - time given: 1000 mg at 0053  ketorolac (Toradol) - time given: 15 mg at 0031  Zofran 8 mg - time given: 0048   Nerve block Not applicable   Intraop Meds dexamethasone (Decadron)  fentanyl (Sublimaze): 50 mcg total  Propofol 130 mg at 0259, succinylcholine, Rocuronium, sugammadex   Local Meds No   Antibiotics gentamicin (Garamycin) - last given at 0222  Rocephin 2 g - last given at yesterday evening at 2103     Pain Patient Currently in Pain: yes   PACU meds  Not applicable   PCA / epidural No   Capnography     Telemetry     Inpatient Telemetry Monitor Ordered? No        Labs Glucose Lab Results   Component Value Date     07/12/2024       Hgb Lab Results   Component Value Date    HGB 13.4 07/12/2024       INR Lab Results   Component Value Date    INR 1.09 07/13/2024      PACU Imaging Not applicable     Wound/Incision     CMS        Equipment Not applicable   Other LDA      IV Access Peripheral IV 07/13/24 Right Antecubital fossa (Active)   Site Assessment WDL 07/13/24 0431   Line Status Infusing 07/13/24 0431   Dressing Transparent 07/13/24 0431   Dressing Status clean;dry;intact 07/13/24 0335   Phlebitis Scale 0-->no symptoms 07/13/24 0431   Infiltration? no 07/13/24 0431   Number of days: 0      Blood Products Not applicable EBL 0 mL   Intake/Output    Drains / Kan     Time of void PreOp      PostOp      Diapered? No   Bladder Scan     PO    water     Vitals    B/P: 98/59  T: 99  F (37.2  C)    Temp src: Axillary  P:  Pulse: 116 (07/13/24 1575)           R: 28  O2:  SpO2: 99 %    O2 Device: Nasal cannula (07/13/24 0825)              Family/support present spouse   Patient belongings     Patient transported on cart   DC meds/scripts (obs/outpt) Not applicable   Inpatient Pain Meds Released? Yes       Special needs/considerations Leave escalona in place per urology for 24 hours    Tasks needing completion None       Jackie Brooks, RN  [facility] [phone]

## 2024-07-14 LAB
ANION GAP SERPL CALCULATED.3IONS-SCNC: 12 MMOL/L (ref 7–15)
BACTERIA UR CULT: ABNORMAL
BUN SERPL-MCNC: 25.4 MG/DL (ref 6–20)
CALCIUM SERPL-MCNC: 8.6 MG/DL (ref 8.6–10)
CHLORIDE SERPL-SCNC: 109 MMOL/L (ref 98–107)
CREAT SERPL-MCNC: 1.09 MG/DL (ref 0.51–0.95)
DEPRECATED HCO3 PLAS-SCNC: 18 MMOL/L (ref 22–29)
EGFRCR SERPLBLD CKD-EPI 2021: 66 ML/MIN/1.73M2
ERYTHROCYTE [DISTWIDTH] IN BLOOD BY AUTOMATED COUNT: 16 % (ref 10–15)
ERYTHROCYTE [DISTWIDTH] IN BLOOD BY AUTOMATED COUNT: 16 % (ref 10–15)
GLUCOSE SERPL-MCNC: 100 MG/DL (ref 70–99)
HCT VFR BLD AUTO: 30.3 % (ref 35–47)
HCT VFR BLD AUTO: 30.5 % (ref 35–47)
HGB BLD-MCNC: 10.2 G/DL (ref 11.7–15.7)
HGB BLD-MCNC: 10.3 G/DL (ref 11.7–15.7)
HOLD SPECIMEN: NORMAL
MCH RBC QN AUTO: 26.9 PG (ref 26.5–33)
MCH RBC QN AUTO: 27 PG (ref 26.5–33)
MCHC RBC AUTO-ENTMCNC: 33.7 G/DL (ref 31.5–36.5)
MCHC RBC AUTO-ENTMCNC: 33.8 G/DL (ref 31.5–36.5)
MCV RBC AUTO: 80 FL (ref 78–100)
MCV RBC AUTO: 80 FL (ref 78–100)
PLATELET # BLD AUTO: 154 10E3/UL (ref 150–450)
PLATELET # BLD AUTO: 154 10E3/UL (ref 150–450)
POTASSIUM SERPL-SCNC: 3.9 MMOL/L (ref 3.4–5.3)
RBC # BLD AUTO: 3.79 10E6/UL (ref 3.8–5.2)
RBC # BLD AUTO: 3.81 10E6/UL (ref 3.8–5.2)
SODIUM SERPL-SCNC: 139 MMOL/L (ref 135–145)
WBC # BLD AUTO: 28.5 10E3/UL (ref 4–11)
WBC # BLD AUTO: 31 10E3/UL (ref 4–11)

## 2024-07-14 PROCEDURE — 250N000013 HC RX MED GY IP 250 OP 250 PS 637: Performed by: STUDENT IN AN ORGANIZED HEALTH CARE EDUCATION/TRAINING PROGRAM

## 2024-07-14 PROCEDURE — 258N000003 HC RX IP 258 OP 636: Performed by: STUDENT IN AN ORGANIZED HEALTH CARE EDUCATION/TRAINING PROGRAM

## 2024-07-14 PROCEDURE — 36415 COLL VENOUS BLD VENIPUNCTURE: CPT | Performed by: STUDENT IN AN ORGANIZED HEALTH CARE EDUCATION/TRAINING PROGRAM

## 2024-07-14 PROCEDURE — 250N000011 HC RX IP 250 OP 636: Performed by: STUDENT IN AN ORGANIZED HEALTH CARE EDUCATION/TRAINING PROGRAM

## 2024-07-14 PROCEDURE — 80048 BASIC METABOLIC PNL TOTAL CA: CPT | Performed by: STUDENT IN AN ORGANIZED HEALTH CARE EDUCATION/TRAINING PROGRAM

## 2024-07-14 PROCEDURE — 85014 HEMATOCRIT: CPT | Performed by: STUDENT IN AN ORGANIZED HEALTH CARE EDUCATION/TRAINING PROGRAM

## 2024-07-14 PROCEDURE — 120N000002 HC R&B MED SURG/OB UMMC

## 2024-07-14 RX ORDER — TAMSULOSIN HYDROCHLORIDE 0.4 MG/1
0.4 CAPSULE ORAL DAILY
Qty: 30 CAPSULE | Refills: 0 | Status: SHIPPED | OUTPATIENT
Start: 2024-07-14 | End: 2024-08-13

## 2024-07-14 RX ORDER — AMLODIPINE BESYLATE 10 MG/1
10 TABLET ORAL DAILY
Status: CANCELLED | OUTPATIENT
Start: 2024-07-14

## 2024-07-14 RX ORDER — CEFTRIAXONE 2 G/1
2 INJECTION, POWDER, FOR SOLUTION INTRAMUSCULAR; INTRAVENOUS EVERY 24 HOURS
Status: DISCONTINUED | OUTPATIENT
Start: 2024-07-15 | End: 2024-07-15 | Stop reason: HOSPADM

## 2024-07-14 RX ORDER — CEFTRIAXONE 1 G/1
1 INJECTION, POWDER, FOR SOLUTION INTRAMUSCULAR; INTRAVENOUS EVERY 24 HOURS
Status: DISCONTINUED | OUTPATIENT
Start: 2024-07-14 | End: 2024-07-14

## 2024-07-14 RX ORDER — IBUPROFEN 400 MG/1
400 TABLET, FILM COATED ORAL EVERY 6 HOURS PRN
Status: DISCONTINUED | OUTPATIENT
Start: 2024-07-14 | End: 2024-07-15 | Stop reason: HOSPADM

## 2024-07-14 RX ORDER — SULFAMETHOXAZOLE/TRIMETHOPRIM 800-160 MG
1 TABLET ORAL 2 TIMES DAILY
Qty: 24 TABLET | Refills: 0 | Status: SHIPPED | OUTPATIENT
Start: 2024-07-14 | End: 2024-07-26

## 2024-07-14 RX ADMIN — TAMSULOSIN HYDROCHLORIDE 0.4 MG: 0.4 CAPSULE ORAL at 08:13

## 2024-07-14 RX ADMIN — CEFTRIAXONE SODIUM 1 G: 1 INJECTION, POWDER, FOR SOLUTION INTRAMUSCULAR; INTRAVENOUS at 12:02

## 2024-07-14 RX ADMIN — SODIUM CHLORIDE: 9 INJECTION, SOLUTION INTRAVENOUS at 06:11

## 2024-07-14 RX ADMIN — SODIUM CHLORIDE: 9 INJECTION, SOLUTION INTRAVENOUS at 17:20

## 2024-07-14 RX ADMIN — SENNOSIDES AND DOCUSATE SODIUM 2 TABLET: 50; 8.6 TABLET ORAL at 08:12

## 2024-07-14 RX ADMIN — ACETAMINOPHEN 650 MG: 325 TABLET, FILM COATED ORAL at 13:42

## 2024-07-14 RX ADMIN — OXYCODONE HYDROCHLORIDE 5 MG: 5 TABLET ORAL at 17:44

## 2024-07-14 ASSESSMENT — ACTIVITIES OF DAILY LIVING (ADL)
ADLS_ACUITY_SCORE: 21
ADLS_ACUITY_SCORE: 19
ADLS_ACUITY_SCORE: 19
ADLS_ACUITY_SCORE: 21

## 2024-07-14 NOTE — CARE PLAN
Shift: 0700 - 1930    Patient is, AOX4 No acute change or events; VSS, Denies shortness of breath,chest pain, numbness/tingling, nausea/vomiting, no fever or chills. Pain managed with PRN oxycodone. Kan removed, Pt is voiding without difficulty to toilet. Pt c/o of a slight headache, PRN tylenol given with relieve.   Pt is able to make needs know, call light is in reach, continue with POC.

## 2024-07-14 NOTE — PLAN OF CARE
"Goal Outcome Evaluation:  Vitally stable, /76 (BP Location: Left arm)   Pulse 97   Temp 99.4  F (37.4  C) (Oral)   Resp 18   Ht 1.676 m (5' 6\")   Wt 93.8 kg (206 lb 12.7 oz)   SpO2 96%   BMI 33.38 kg/m      Alert and oriented X4.    Independent/standby assist in the room.    Urethra catheter.    Right PIV infusing normal saline at 100 ml/hr    Pain managed with tylenol and oxycodone.    Continue plan of care.                      "

## 2024-07-14 NOTE — PROGRESS NOTES
"Urology  Progress Note    NAEO  Pain well controlled  Now Afebrile x 24 hours, HR to 97  Escalona in place     Exam  /76 (BP Location: Left arm)   Pulse 97   Temp 99.4  F (37.4  C) (Oral)   Resp 18   Ht 1.676 m (5' 6\")   Wt 93.8 kg (206 lb 12.7 oz)   SpO2 96%   BMI 33.38 kg/m    No acute distress  Unlabored breathing  Abdomen soft,  appropriately tender, nondistended.   Escalona with dark yellow tinged urine in tubing      UOP 1385/650    Labs  Recent Labs   Lab Test 07/13/24  0717 07/12/24  1854   WBC 21.0* 25.8*   HGB 10.8* 13.4   CR 1.50* 0.96*         Culture Positive on the 1st day of incubation Abnormal       Escherichia coli Panic    2 of 2 bottles        Resulting Agency: [facility]     Susceptibility     Escherichia coli     [facility]     Ampicillin >=32 ug/mL Resistant     Ampicillin/ Sulbactam >=32 ug/mL Resistant     Cefepime <=1 ug/mL Susceptible     Ceftazidime <=1 ug/mL Susceptible     Ceftriaxone <=1 ug/mL Susceptible     Ciprofloxacin <=0.25 ug/mL Susceptible     Gentamicin <=1 ug/mL Susceptible     Levofloxacin <=0.12 ug/mL Susceptible     Meropenem <=0.25 ug/mL Susceptible     Piperacillin/Tazobactam <=4 ug/mL Susceptible     Tobramycin <=1 ug/mL Susceptible     Trimethoprim/Sulfamethoxazole <=1/19 ug/mL Susceptible              Assessment/Plan  39 year old female POD#2 s/p L ureteral stent placement for obstructing stone in the setting of urosepsis.   - Pain control as needed  - transition to bactrim at disharge, will treat with 10-14 day course for complicated UTI  - escalona to be discontinued today   - discharge today pending labs and would like her to get one more dose of IV abx prior to discharge   - update: WBC is elevated to 31. Patient clinically looks well. Will recheck CBC at noon to reassess, if significantly improved may still be able to discharge later after.    Seen and examined with the chief resident. Will discuss with Dr. Conklin.    Edgar Artis MD PGY4  Urology Resident       " Contacting the Urology Team     Please use the following job codes to reach the Urology Team. Note that you must use an in house phone and that job codes cannot receive text pages.   On weekdays, dial [phone] (or star-star-star [phone] on the new [facility] [employer] telephones) then 0817 to reach the Adult Urology resident or PA on call  On weekdays, dial [phone] (or star-star-star [phone] on the new [employer] [employer] telephones) then 0818 to reach the Pediatric Urology resident  On weeknights and weekends, dial [phone] (or star-star-star [phone] on the new [employer] [employer] telephones) then [phone] to reach the Urology resident on call (for both Adult and Pediatrics)

## 2024-07-15 VITALS
SYSTOLIC BLOOD PRESSURE: 150 MMHG | RESPIRATION RATE: 16 BRPM | HEIGHT: 66 IN | HEART RATE: 105 BPM | WEIGHT: 206.79 LBS | DIASTOLIC BLOOD PRESSURE: 105 MMHG | OXYGEN SATURATION: 97 % | TEMPERATURE: 99.5 F | BODY MASS INDEX: 33.23 KG/M2

## 2024-07-15 LAB
ANION GAP SERPL CALCULATED.3IONS-SCNC: 11 MMOL/L (ref 7–15)
BACTERIA BLD CULT: ABNORMAL
BUN SERPL-MCNC: 14.3 MG/DL (ref 6–20)
CALCIUM SERPL-MCNC: 8.5 MG/DL (ref 8.6–10)
CHLORIDE SERPL-SCNC: 105 MMOL/L (ref 98–107)
CREAT SERPL-MCNC: 0.8 MG/DL (ref 0.51–0.95)
DEPRECATED HCO3 PLAS-SCNC: 21 MMOL/L (ref 22–29)
EGFRCR SERPLBLD CKD-EPI 2021: >90 ML/MIN/1.73M2
ERYTHROCYTE [DISTWIDTH] IN BLOOD BY AUTOMATED COUNT: 15.9 % (ref 10–15)
GLUCOSE SERPL-MCNC: 86 MG/DL (ref 70–99)
HCT VFR BLD AUTO: 29.8 % (ref 35–47)
HGB BLD-MCNC: 9.8 G/DL (ref 11.7–15.7)
MCH RBC QN AUTO: 26.2 PG (ref 26.5–33)
MCHC RBC AUTO-ENTMCNC: 32.9 G/DL (ref 31.5–36.5)
MCV RBC AUTO: 80 FL (ref 78–100)
PLATELET # BLD AUTO: 159 10E3/UL (ref 150–450)
POTASSIUM SERPL-SCNC: 3.2 MMOL/L (ref 3.4–5.3)
RBC # BLD AUTO: 3.74 10E6/UL (ref 3.8–5.2)
SODIUM SERPL-SCNC: 137 MMOL/L (ref 135–145)
WBC # BLD AUTO: 16 10E3/UL (ref 4–11)

## 2024-07-15 PROCEDURE — 84295 ASSAY OF SERUM SODIUM: CPT | Performed by: STUDENT IN AN ORGANIZED HEALTH CARE EDUCATION/TRAINING PROGRAM

## 2024-07-15 PROCEDURE — 258N000003 HC RX IP 258 OP 636

## 2024-07-15 PROCEDURE — 36415 COLL VENOUS BLD VENIPUNCTURE: CPT | Performed by: STUDENT IN AN ORGANIZED HEALTH CARE EDUCATION/TRAINING PROGRAM

## 2024-07-15 PROCEDURE — 85014 HEMATOCRIT: CPT | Performed by: STUDENT IN AN ORGANIZED HEALTH CARE EDUCATION/TRAINING PROGRAM

## 2024-07-15 PROCEDURE — 250N000011 HC RX IP 250 OP 636: Performed by: STUDENT IN AN ORGANIZED HEALTH CARE EDUCATION/TRAINING PROGRAM

## 2024-07-15 PROCEDURE — 250N000013 HC RX MED GY IP 250 OP 250 PS 637: Performed by: STUDENT IN AN ORGANIZED HEALTH CARE EDUCATION/TRAINING PROGRAM

## 2024-07-15 RX ADMIN — SODIUM CHLORIDE: 9 INJECTION, SOLUTION INTRAVENOUS at 02:42

## 2024-07-15 RX ADMIN — ACETAMINOPHEN 650 MG: 325 TABLET, FILM COATED ORAL at 08:08

## 2024-07-15 RX ADMIN — OXYCODONE HYDROCHLORIDE 5 MG: 5 TABLET ORAL at 02:43

## 2024-07-15 RX ADMIN — ACETAMINOPHEN 650 MG: 325 TABLET, FILM COATED ORAL at 02:43

## 2024-07-15 RX ADMIN — OXYCODONE HYDROCHLORIDE 5 MG: 5 TABLET ORAL at 08:09

## 2024-07-15 RX ADMIN — TAMSULOSIN HYDROCHLORIDE 0.4 MG: 0.4 CAPSULE ORAL at 08:08

## 2024-07-15 RX ADMIN — SENNOSIDES AND DOCUSATE SODIUM 2 TABLET: 50; 8.6 TABLET ORAL at 08:08

## 2024-07-15 RX ADMIN — CEFTRIAXONE SODIUM 2 G: 2 INJECTION, POWDER, FOR SOLUTION INTRAMUSCULAR; INTRAVENOUS at 10:16

## 2024-07-15 ASSESSMENT — ACTIVITIES OF DAILY LIVING (ADL)
ADLS_ACUITY_SCORE: 19

## 2024-07-15 NOTE — PLAN OF CARE
"Goal Outcome Evaluation:      Plan of Care Reviewed With: patient    Overall Patient Progress: no change    Outcome Evaluation: Discharging today    VS:       Pt A/O X 4. Afebrile. VSS.BP (!) 150/105 (BP Location: Left arm)   Pulse 105   Temp 99.5  F (37.5  C) (Oral)   Resp 16   Ht 1.676 m (5' 6\")   Wt 93.8 kg (206 lb 12.7 oz)   SpO2 97%   BMI 33.38 kg/m    Lungs- clear bilaterally with both anterior and posterior. IS encouraged. Denies nausea, shortness of breath, and chest pain.     Output:       Bowels- active in all four quadrants. Voids spontaneously without difficulty in the bathroom.      Activity:       Pt up independently.      Skin:   Skin intact.     Pain:       Has pain in the neck/back and given tylenol and oxycodone, and is tolerating well.      CMS:       CMS and Neuro's are intact. Denies numbness and tingling in all extremities.      Dressing:       No incisional dressing.     Diet:       Pt is on a regular diet and appetite was good this shift.       LDA:       No PIV.     Equipment:       Pt belongings in the room. Pt denies PCD's on BLE's. Bilateral heels are elevated off the bed.     Plan:       Pt is able to make needs known and the call light is within the pt's reach. Continue to monitor.       Additional Info:            DISCHARGE SUMMARY    Pt discharging to: Home  Transportation: family member   AVS given and discussed: Yes, no further questions.   Medications given: Yes, discussed. No further questions.   Belongings returned: Yes, ensured all belongings packed and sent with pt. No items in security.   Comments: Escorted safely to elevators.   "

## 2024-07-15 NOTE — PLAN OF CARE
Goal Outcome Evaluation:       Alert and oriented X4.   Independent in the room/ standby assist.    Right PIV infusing normal saline at 125 ml/hr    Denies pain. Voiding without difficulty. Continent of bowel.     Continue plan of care.

## 2024-07-15 NOTE — DISCHARGE SUMMARY
"[facility]   Urology Discharge Summary    Date of Admission: 7/13/2024  Date of Discharge: 07/15/2024    Admission Diagnosis:  Ureterolithiasis [N20.1]  Pyelonephritis [N12]    Discharge Diagnosis:  1. Same as above    Consultations:  None     Procedures:  Procedure(s):  Cystoscopy, retrogrades, insert stent ureter(s), combined    Brief HPI:  Dejah Brown is a 39 year old year old female with infection and obstructing renal stone. After discussion of the risks, benefits, and alternatives, they underwent the aforementioned procedure.     Hospital Course:  The patient tolerated the procedure well without complications and was transferred to the floor post-operatively.The patient's diet was advanced as bowel function returned. Pain was controlled with oral pain medication and the patient was able to ambulate and  void without difficulty. The patient received appropriate education post operatively. On 07/15/2024 the patient was discharged to home.     Discharge Physical Exam:  BP (!) 150/105 (BP Location: Left arm)   Pulse 105   Temp 99.5  F (37.5  C) (Oral)   Resp 16   Ht 1.676 m (5' 6\")   Wt 93.8 kg (206 lb 12.7 oz)   SpO2 97%   BMI 33.38 kg/m      See progress note from same day for DC exam    Meds:       Review of your medicines        START taking        Dose / Directions   sulfamethoxazole-trimethoprim 800-160 MG tablet  Commonly known as: BACTRIM DS      Dose: 1 tablet  Take 1 tablet by mouth 2 times daily for 12 days  Quantity: 24 tablet  Refills: 0     tamsulosin 0.4 MG capsule  Commonly known as: FLOMAX      Dose: 0.4 mg  Take 1 capsule (0.4 mg) by mouth daily for 30 days  Quantity: 30 capsule  Refills: 0            CONTINUE these medicines which have NOT CHANGED        Dose / Directions   amLODIPine 10 MG tablet  Commonly known as: NORVASC      Dose: 10 mg  Take 10 mg by mouth daily  Refills: 0     ferrous sulfate 325 (65 Fe) MG tablet  Commonly known as: FEROSUL   "    Dose: 325 mg  Take 325 mg by mouth daily (with breakfast)  Refills: 0     metoprolol succinate ER 25 MG 24 hr tablet  Commonly known as: TOPROL XL      Dose: 25 mg  Take 25 mg by mouth daily  Refills: 0               Where to get your medicines        These medications were sent to [facility] [place], [place] - [address]  [address] [facility], [facility] [place] [address]      Phone: [phone]   sulfamethoxazole-trimethoprim 800-160 MG tablet  tamsulosin 0.4 MG capsule         Additional instructions:  After Care       Future Labs/Procedures    Activity     Comments:    Your activity upon discharge: as tolerated    Diet     Comments:    Follow this diet upon discharge: regular    Discharge Instructions     Comments:    Activity  - There are no activity restrictions    Diet:  You may resume your regular diet.     Common symptoms related to the stent:  - You will likely notice some blood in the urine for as long as the stent is in place. You may also notice more blood in the urine after physical activity. Normal urine color can range from yellow to dark red. This is not problematic as long as you are able to empty your bladder. Although urine may seem quite bloody, a few drops of blood can color the entire toilet bowl red- much like food coloring. Increase your fluid intake to help flush the blood out of your bladder.   - You may also notice a twinge of pain in your kidney during urination. This can be severe, but should get better after the first few days with the stent. This is due to urine traveling backward (up the stent into your kidney) when the bladder squeezes. It is normal and not a cause for concern.  - You may experience some burning with urination due to minor trauma from the scope used during your surgery. This should disappear over the next few days.     Medications:  Anti-inflammatories/NSAIDs (Ibuprofen, Advil, Aleve) are the most effective pain relievers for stent-related  "discomfort. You may safely use these in combination with Tylenol.   You may also take Tylenol for pain control- but not more than 3000 mg daily.  We recommend over the counter fiber (metamucil or benefiber) and stool softeners (miralax, docusate or senna) to prevent postoperative constipation, but stop if you develop diarrhea.      Follow-Up:  - Follow up in [place] with a Urologist to have your stone treated. This should be done within 3 months given your stent.   - Any local urologist of your choice will be ok. For example [facility] urology, phone number [phone]  - Call or return sooner than your regularly scheduled visit if you develop any of the following: fever (greater than 101.5), uncontrolled pain, uncontrolled nausea or vomiting, or inability to urinate.    Phone numbers:   - Monday through Friday 8am to 4:30pm: Call [phone] with questions, requests for medication refills, or to schedule or confirm an appointment.  - Nights or weekends: call the after hours emergency pager - [phone] and tell the  \"I would like to page the Urology Resident on call.\" Please note, due to prescribing laws, resident physicians are unable to prescribe narcotics after-hours. If you feel as though you will need a refill of a narcotic pain medication, you will need to call the clinic during business hours OR seek emergency care.  - For emergencies, call 911            Follow Up:  - Follow-up with your urologist as scheduled   - Call or return sooner than your regularly scheduled visit if you develop any of the following: fever (greater than 101.5), uncontrolled pain, uncontrolled nausea or vomiting, as well as increased redness, swelling, or drainage from your wound.     Phone numbers:   - Monday through Friday 8am to 4:30pm: Call [phone] with questions or to schedule or confirm appointment.    - Nights or weekends: call the after hours emergency pager - [phone] and tell the  \"I would like to " "page the Urology Resident on call.\"  - For emergencies, call 911    The patient was discussed with the chief resident and staff on the day of discharge.     Zay Amaya MD  Urology Resident               "

## 2024-07-15 NOTE — PLAN OF CARE
"Goal Outcome Evaluation:  Blood pressure (!) 144/102, pulse 98, temperature 98.8  F (37.1  C), temperature source Oral, resp. rate 18, height 1.676 m (5' 6\"), weight 93.8 kg (206 lb 12.7 oz), SpO2 98%.  Pt is on RA No SOB noted. Pt was sleeping intermittently and woke up during the night due to pain on LUQ and LLQ 7-8/10 managed using PRN oxycodone and Tylenol. Independent in her room and to the bathroom. Pt reported pain on urination. Pt reported intermittently hematuria. Call light within pt reach. Pt can express her needs properly.      Plan of Care Reviewed With: patient    Problem: Adult Inpatient Plan of Care  Goal: Plan of Care Review  Description: The Plan of Care Review/Shift note should be completed every shift.  The Outcome Evaluation is a brief statement about your assessment that the patient is improving, declining, or no change.  This information will be displayed automatically on your shift  note.  Outcome: Progressing  Flowsheets (Taken 7/15/2024 0138)  Plan of Care Reviewed With: patient  Goal: Patient-Specific Goal (Individualized)  Description: You can add care plan individualizations to a care plan. Examples of Individualization might be:  \"Parent requests to be called daily at 9am for status\", \"I have a hard time hearing out of my right ear\", or \"Do not touch me to wake me up as it startles  me\".  Outcome: Progressing  Goal: Absence of Hospital-Acquired Illness or Injury  Outcome: Progressing  Goal: Optimal Comfort and Wellbeing  Outcome: Progressing  Goal: Readiness for Transition of Care  Outcome: Progressing     Problem: Skin Injury Risk Increased  Goal: Skin Health and Integrity  Outcome: Progressing     Problem: Pain Acute  Goal: Optimal Pain Control and Function  Outcome: Progressing     Problem: Urinary Elimination Management  Goal: Effective Urinary Elimination/Continence  Outcome: Progressing                    "

## 2024-07-15 NOTE — PROGRESS NOTES
"Urology  Progress Note    NAEO  Pain well controlled  Now Afebrile x 24 hours, HR in 90s    Exam  BP (!) 144/102 (BP Location: Left arm)   Pulse 98   Temp 98.8  F (37.1  C) (Oral)   Resp 18   Ht 1.676 m (5' 6\")   Wt 93.8 kg (206 lb 12.7 oz)   SpO2 98%   BMI 33.38 kg/m    No acute distress  Unlabored breathing  Abdomen soft appears nondistended    I/O last 3 completed shifts:  In: 3147 [I.V.:3147]  Out: 600 [Urine:600]     Labs  Recent Labs   Lab Test 07/14/24  1250 07/14/24  0818 07/13/24  0717 07/12/24  1854   WBC 28.5* 31.0* 21.0* 25.8*   HGB 10.2* 10.3* 10.8* 13.4   CR  --  1.09* 1.50* 0.96*      All cultures:  Recent Labs   Lab 07/13/24  0316 07/12/24  1907 07/12/24  1856 07/12/24  1854   CULTURE >100,000 CFU/mL Escherichia coli* Positive on the 1st day of incubation*  Escherichia coli* 50,000-100,000 CFU/mL Mixture of urogenital judith Positive on the 1st day of incubation*  Escherichia coli*        Assessment/Plan  39 year old female POD#3 s/p L ureteral stent placement for obstructing stone in the setting of infection.     - Pain control   - transition to bactrim at disharge, will treat with 10-14 day course for complicated UTI  - Voiding   - discharge today pending labs        Will discuss with Dr. Conklin.    Zay Amaya MD PGY3  Urology Resident       Contacting the Urology Team     Please use the following job codes to reach the Urology Team. Note that you must use an in house phone and that job codes cannot receive text pages.   On weekdays, dial [phone] (or star-star-star [phone] on the new [place] telephones) then 0817 to reach the Adult Urology resident or PA on call  On weekdays, dial [phone] (or star-star-star [phone] on the new [place] telephones) then [phone] to reach the Pediatric Urology resident  On weeknights and weekends, dial [phone] (or star-star-star [phone] on the new [place] telephones) then [phone] to reach the Urology resident on call (for both Adult and Pediatrics)            "

## 2024-07-28 ENCOUNTER — HEALTH MAINTENANCE LETTER (OUTPATIENT)
Age: 39
End: 2024-07-28

## 2025-08-10 ENCOUNTER — HEALTH MAINTENANCE LETTER (OUTPATIENT)
Age: 40
End: 2025-08-10

## (undated) DEVICE — DRAPE C-ARM W/STRAPS 42X72" 07-CA104

## (undated) DEVICE — CATH URETERAL OPEN END 5FRX70CM M0064002010

## (undated) DEVICE — PAD CHUX UNDERPAD 30X36" P3036C

## (undated) DEVICE — SOL NACL 0.9% IRRIG 3000ML BAG 2B7477

## (undated) DEVICE — CATH TRAY FOLEY SURESTEP 16FR W/URINE MTR STATLK LF A303416A

## (undated) DEVICE — CONTAINER SPECIMEN 4OZ STERILE 17099

## (undated) DEVICE — SOL WATER IRRIG 1000ML BOTTLE 2F7114

## (undated) DEVICE — GLOVE BIOGEL PI MICRO SZ 7.5 48575

## (undated) DEVICE — SUCTION MANIFOLD NEPTUNE 2 SYS 4 PORT 0702-020-000

## (undated) DEVICE — SOL NACL 0.9% IRRIG 1000ML BOTTLE 2F7124

## (undated) DEVICE — LINEN GOWN X4 5410

## (undated) DEVICE — LINEN TOWEL PACK X5 5464

## (undated) DEVICE — Device

## (undated) DEVICE — GUIDEWIRE SENSOR DUAL FLEX STR 0.035"X150CM M0066703080

## (undated) DEVICE — GOWN XLG DISP 9545

## (undated) RX ORDER — IOPAMIDOL 510 MG/ML
INJECTION, SOLUTION INTRAVASCULAR
Status: DISPENSED
Start: 2024-07-13

## (undated) RX ORDER — FENTANYL CITRATE 50 UG/ML
INJECTION, SOLUTION INTRAMUSCULAR; INTRAVENOUS
Status: DISPENSED
Start: 2024-07-13

## (undated) RX ORDER — ONDANSETRON 2 MG/ML
INJECTION INTRAMUSCULAR; INTRAVENOUS
Status: DISPENSED
Start: 2024-07-13

## (undated) RX ORDER — SODIUM CHLORIDE, SODIUM LACTATE, POTASSIUM CHLORIDE, CALCIUM CHLORIDE 600; 310; 30; 20 MG/100ML; MG/100ML; MG/100ML; MG/100ML
INJECTION, SOLUTION INTRAVENOUS
Status: DISPENSED
Start: 2024-07-13

## (undated) RX ORDER — DEXAMETHASONE SODIUM PHOSPHATE 4 MG/ML
INJECTION, SOLUTION INTRA-ARTICULAR; INTRALESIONAL; INTRAMUSCULAR; INTRAVENOUS; SOFT TISSUE
Status: DISPENSED
Start: 2024-07-13

## (undated) RX ORDER — PROPOFOL 10 MG/ML
INJECTION, EMULSION INTRAVENOUS
Status: DISPENSED
Start: 2024-07-13